# Patient Record
Sex: FEMALE | Race: BLACK OR AFRICAN AMERICAN | NOT HISPANIC OR LATINO | Employment: OTHER | URBAN - METROPOLITAN AREA
[De-identification: names, ages, dates, MRNs, and addresses within clinical notes are randomized per-mention and may not be internally consistent; named-entity substitution may affect disease eponyms.]

---

## 2024-07-20 ENCOUNTER — APPOINTMENT (OUTPATIENT)
Dept: RADIOLOGY | Facility: HOSPITAL | Age: 89
DRG: 194 | End: 2024-07-20
Payer: COMMERCIAL

## 2024-07-20 ENCOUNTER — APPOINTMENT (OUTPATIENT)
Dept: CARDIOLOGY | Facility: HOSPITAL | Age: 89
DRG: 194 | End: 2024-07-20
Payer: COMMERCIAL

## 2024-07-20 ENCOUNTER — HOSPITAL ENCOUNTER (INPATIENT)
Facility: HOSPITAL | Age: 89
LOS: 3 days | Discharge: HOSPICE | DRG: 194 | End: 2024-07-23
Attending: EMERGENCY MEDICINE | Admitting: INTERNAL MEDICINE
Payer: COMMERCIAL

## 2024-07-20 DIAGNOSIS — J18.9 PNEUMONIA DUE TO INFECTIOUS ORGANISM, UNSPECIFIED LATERALITY, UNSPECIFIED PART OF LUNG: Primary | ICD-10-CM

## 2024-07-20 DIAGNOSIS — Z51.5 HOSPICE CARE: ICD-10-CM

## 2024-07-20 LAB
ABO GROUP (TYPE) IN BLOOD: NORMAL
ABO GROUP (TYPE) IN BLOOD: NORMAL
ALBUMIN SERPL BCP-MCNC: 2.8 G/DL (ref 3.4–5)
ALP SERPL-CCNC: 128 U/L (ref 33–136)
ALT SERPL W P-5'-P-CCNC: 16 U/L (ref 7–45)
ANION GAP BLDV CALCULATED.4IONS-SCNC: 10 MMOL/L (ref 10–25)
ANION GAP SERPL CALC-SCNC: 16 MMOL/L (ref 10–20)
ANTIBODY SCREEN: NORMAL
AST SERPL W P-5'-P-CCNC: 28 U/L (ref 9–39)
BASE EXCESS BLDV CALC-SCNC: 1.6 MMOL/L (ref -2–3)
BASOPHILS # BLD AUTO: 0.01 X10*3/UL (ref 0–0.1)
BASOPHILS NFR BLD AUTO: 0.1 %
BILIRUB SERPL-MCNC: 0.4 MG/DL (ref 0–1.2)
BLOOD EXPIRATION DATE: NORMAL
BNP SERPL-MCNC: 140 PG/ML (ref 0–99)
BODY TEMPERATURE: 37 DEGREES CELSIUS
BUN SERPL-MCNC: 67 MG/DL (ref 6–23)
CA-I BLDV-SCNC: 1.24 MMOL/L (ref 1.1–1.33)
CALCIUM SERPL-MCNC: 8.2 MG/DL (ref 8.6–10.3)
CHLORIDE BLDV-SCNC: 120 MMOL/L (ref 98–107)
CHLORIDE SERPL-SCNC: 117 MMOL/L (ref 98–107)
CO2 SERPL-SCNC: 24 MMOL/L (ref 21–32)
CREAT SERPL-MCNC: 1.77 MG/DL (ref 0.5–1.05)
DACRYOCYTES BLD QL SMEAR: NORMAL
DISPENSE STATUS: NORMAL
EGFRCR SERPLBLD CKD-EPI 2021: 27 ML/MIN/1.73M*2
EOSINOPHIL # BLD AUTO: 0.04 X10*3/UL (ref 0–0.4)
EOSINOPHIL NFR BLD AUTO: 0.3 %
ERYTHROCYTE [DISTWIDTH] IN BLOOD BY AUTOMATED COUNT: 21.5 % (ref 11.5–14.5)
GLUCOSE BLD MANUAL STRIP-MCNC: 121 MG/DL (ref 74–99)
GLUCOSE BLD MANUAL STRIP-MCNC: 133 MG/DL (ref 74–99)
GLUCOSE BLD MANUAL STRIP-MCNC: 65 MG/DL (ref 74–99)
GLUCOSE BLD MANUAL STRIP-MCNC: 67 MG/DL (ref 74–99)
GLUCOSE BLDV-MCNC: 79 MG/DL (ref 74–99)
GLUCOSE SERPL-MCNC: 73 MG/DL (ref 74–99)
HCO3 BLDV-SCNC: 26.6 MMOL/L (ref 22–26)
HCT VFR BLD AUTO: 24.1 % (ref 36–46)
HCT VFR BLD EST: 19 % (ref 36–46)
HGB BLD-MCNC: 7 G/DL (ref 12–16)
HGB BLDV-MCNC: 6.3 G/DL (ref 12–16)
HYPOCHROMIA BLD QL SMEAR: NORMAL
IMM GRANULOCYTES # BLD AUTO: 0.17 X10*3/UL (ref 0–0.5)
IMM GRANULOCYTES NFR BLD AUTO: 1.3 % (ref 0–0.9)
INHALED O2 CONCENTRATION: 21 %
INR PPP: 1.5 (ref 0.9–1.1)
IRON SATN MFR SERPL: 34 % (ref 25–45)
IRON SERPL-MCNC: 45 UG/DL (ref 35–150)
LACTATE BLDV-SCNC: 4.2 MMOL/L (ref 0.4–2)
LACTATE BLDV-SCNC: 4.3 MMOL/L (ref 0.4–2)
LACTATE SERPL-SCNC: 4.6 MMOL/L (ref 0.4–2)
LACTATE SERPL-SCNC: 4.6 MMOL/L (ref 0.4–2)
LACTATE SERPL-SCNC: 5 MMOL/L (ref 0.4–2)
LYMPHOCYTES # BLD AUTO: 0.83 X10*3/UL (ref 0.8–3)
LYMPHOCYTES NFR BLD AUTO: 6.4 %
MCH RBC QN AUTO: 24.9 PG (ref 26–34)
MCHC RBC AUTO-ENTMCNC: 29 G/DL (ref 32–36)
MCV RBC AUTO: 86 FL (ref 80–100)
MONOCYTES # BLD AUTO: 0.54 X10*3/UL (ref 0.05–0.8)
MONOCYTES NFR BLD AUTO: 4.2 %
MRSA DNA SPEC QL NAA+PROBE: NOT DETECTED
NEUTROPHILS # BLD AUTO: 11.31 X10*3/UL (ref 1.6–5.5)
NEUTROPHILS NFR BLD AUTO: 87.7 %
NRBC BLD-RTO: 0 /100 WBCS (ref 0–0)
OVALOCYTES BLD QL SMEAR: NORMAL
OXYHGB MFR BLDV: 48.4 % (ref 45–75)
PCO2 BLDV: 43 MM HG (ref 41–51)
PH BLDV: 7.4 PH (ref 7.33–7.43)
PLATELET # BLD AUTO: 192 X10*3/UL (ref 150–450)
PO2 BLDV: 34 MM HG (ref 35–45)
POTASSIUM BLDV-SCNC: 3.4 MMOL/L (ref 3.5–5.3)
POTASSIUM SERPL-SCNC: 3.9 MMOL/L (ref 3.5–5.3)
PRODUCT BLOOD TYPE: 5100
PRODUCT CODE: NORMAL
PROT SERPL-MCNC: 5.6 G/DL (ref 6.4–8.2)
PROTHROMBIN TIME: 16.9 SECONDS (ref 9.8–12.8)
RBC # BLD AUTO: 2.81 X10*6/UL (ref 4–5.2)
RBC MORPH BLD: NORMAL
RH FACTOR (ANTIGEN D): NORMAL
RH FACTOR (ANTIGEN D): NORMAL
SAO2 % BLDV: 50 % (ref 45–75)
SCHISTOCYTES BLD QL SMEAR: NORMAL
SODIUM BLDV-SCNC: 153 MMOL/L (ref 136–145)
SODIUM SERPL-SCNC: 153 MMOL/L (ref 136–145)
TIBC SERPL-MCNC: 134 UG/DL (ref 240–445)
UIBC SERPL-MCNC: 89 UG/DL (ref 110–370)
UNIT ABO: NORMAL
UNIT NUMBER: NORMAL
UNIT RH: NORMAL
UNIT VOLUME: 350
WBC # BLD AUTO: 12.9 X10*3/UL (ref 4.4–11.3)
XM INTEP: NORMAL

## 2024-07-20 PROCEDURE — 96361 HYDRATE IV INFUSION ADD-ON: CPT

## 2024-07-20 PROCEDURE — 2500000004 HC RX 250 GENERAL PHARMACY W/ HCPCS (ALT 636 FOR OP/ED): Performed by: INTERNAL MEDICINE

## 2024-07-20 PROCEDURE — 36415 COLL VENOUS BLD VENIPUNCTURE: CPT | Performed by: EMERGENCY MEDICINE

## 2024-07-20 PROCEDURE — 86901 BLOOD TYPING SEROLOGIC RH(D): CPT

## 2024-07-20 PROCEDURE — 85025 COMPLETE CBC W/AUTO DIFF WBC: CPT

## 2024-07-20 PROCEDURE — 36430 TRANSFUSION BLD/BLD COMPNT: CPT

## 2024-07-20 PROCEDURE — 82947 ASSAY GLUCOSE BLOOD QUANT: CPT

## 2024-07-20 PROCEDURE — 83605 ASSAY OF LACTIC ACID: CPT | Performed by: EMERGENCY MEDICINE

## 2024-07-20 PROCEDURE — 2500000005 HC RX 250 GENERAL PHARMACY W/O HCPCS: Performed by: EMERGENCY MEDICINE

## 2024-07-20 PROCEDURE — 84132 ASSAY OF SERUM POTASSIUM: CPT | Performed by: EMERGENCY MEDICINE

## 2024-07-20 PROCEDURE — P9016 RBC LEUKOCYTES REDUCED: HCPCS

## 2024-07-20 PROCEDURE — 2500000002 HC RX 250 W HCPCS SELF ADMINISTERED DRUGS (ALT 637 FOR MEDICARE OP, ALT 636 FOR OP/ED): Performed by: INTERNAL MEDICINE

## 2024-07-20 PROCEDURE — 2500000001 HC RX 250 WO HCPCS SELF ADMINISTERED DRUGS (ALT 637 FOR MEDICARE OP): Performed by: INTERNAL MEDICINE

## 2024-07-20 PROCEDURE — 71045 X-RAY EXAM CHEST 1 VIEW: CPT

## 2024-07-20 PROCEDURE — 2500000004 HC RX 250 GENERAL PHARMACY W/ HCPCS (ALT 636 FOR OP/ED): Performed by: PHARMACIST

## 2024-07-20 PROCEDURE — 87040 BLOOD CULTURE FOR BACTERIA: CPT | Mod: AHULAB

## 2024-07-20 PROCEDURE — 2060000001 HC INTERMEDIATE ICU ROOM DAILY

## 2024-07-20 PROCEDURE — 36415 COLL VENOUS BLD VENIPUNCTURE: CPT

## 2024-07-20 PROCEDURE — 83605 ASSAY OF LACTIC ACID: CPT | Performed by: INTERNAL MEDICINE

## 2024-07-20 PROCEDURE — 96365 THER/PROPH/DIAG IV INF INIT: CPT

## 2024-07-20 PROCEDURE — 87641 MR-STAPH DNA AMP PROBE: CPT

## 2024-07-20 PROCEDURE — 83605 ASSAY OF LACTIC ACID: CPT

## 2024-07-20 PROCEDURE — 84132 ASSAY OF SERUM POTASSIUM: CPT

## 2024-07-20 PROCEDURE — 85610 PROTHROMBIN TIME: CPT | Performed by: EMERGENCY MEDICINE

## 2024-07-20 PROCEDURE — 86920 COMPATIBILITY TEST SPIN: CPT

## 2024-07-20 PROCEDURE — 93005 ELECTROCARDIOGRAM TRACING: CPT

## 2024-07-20 PROCEDURE — 2500000004 HC RX 250 GENERAL PHARMACY W/ HCPCS (ALT 636 FOR OP/ED)

## 2024-07-20 PROCEDURE — 2500000004 HC RX 250 GENERAL PHARMACY W/ HCPCS (ALT 636 FOR OP/ED): Performed by: EMERGENCY MEDICINE

## 2024-07-20 PROCEDURE — 99285 EMERGENCY DEPT VISIT HI MDM: CPT | Mod: 25

## 2024-07-20 PROCEDURE — 71045 X-RAY EXAM CHEST 1 VIEW: CPT | Mod: FOREIGN READ | Performed by: RADIOLOGY

## 2024-07-20 PROCEDURE — 96366 THER/PROPH/DIAG IV INF ADDON: CPT

## 2024-07-20 PROCEDURE — 94640 AIRWAY INHALATION TREATMENT: CPT

## 2024-07-20 PROCEDURE — 83880 ASSAY OF NATRIURETIC PEPTIDE: CPT

## 2024-07-20 PROCEDURE — 83540 ASSAY OF IRON: CPT | Performed by: INTERNAL MEDICINE

## 2024-07-20 RX ORDER — ACETAMINOPHEN 650 MG/1
650 SUPPOSITORY RECTAL EVERY 4 HOURS PRN
Status: DISCONTINUED | OUTPATIENT
Start: 2024-07-20 | End: 2024-07-23 | Stop reason: HOSPADM

## 2024-07-20 RX ORDER — SERTRALINE HYDROCHLORIDE 50 MG/1
50 TABLET, FILM COATED ORAL DAILY
COMMUNITY
End: 2024-07-23 | Stop reason: HOSPADM

## 2024-07-20 RX ORDER — DEXTROSE 50 % IN WATER (D50W) INTRAVENOUS SYRINGE
25
Status: DISCONTINUED | OUTPATIENT
Start: 2024-07-20 | End: 2024-07-23 | Stop reason: HOSPADM

## 2024-07-20 RX ORDER — AMIODARONE HYDROCHLORIDE 200 MG/1
200 TABLET ORAL DAILY
Status: DISCONTINUED | OUTPATIENT
Start: 2024-07-20 | End: 2024-07-23 | Stop reason: HOSPADM

## 2024-07-20 RX ORDER — ONDANSETRON HYDROCHLORIDE 2 MG/ML
4 INJECTION, SOLUTION INTRAVENOUS EVERY 8 HOURS PRN
Status: DISCONTINUED | OUTPATIENT
Start: 2024-07-20 | End: 2024-07-23 | Stop reason: HOSPADM

## 2024-07-20 RX ORDER — DIVALPROEX SODIUM 125 MG/1
125 CAPSULE, COATED PELLETS ORAL 2 TIMES DAILY
COMMUNITY
End: 2024-07-23 | Stop reason: HOSPADM

## 2024-07-20 RX ORDER — ACETAMINOPHEN 325 MG/1
650 TABLET ORAL EVERY 4 HOURS PRN
Status: DISCONTINUED | OUTPATIENT
Start: 2024-07-20 | End: 2024-07-23 | Stop reason: HOSPADM

## 2024-07-20 RX ORDER — POLYETHYLENE GLYCOL 3350 17 G/17G
17 POWDER, FOR SOLUTION ORAL DAILY
Status: DISCONTINUED | OUTPATIENT
Start: 2024-07-20 | End: 2024-07-22

## 2024-07-20 RX ORDER — DEXTROSE 50 % IN WATER (D50W) INTRAVENOUS SYRINGE
12.5
Status: DISCONTINUED | OUTPATIENT
Start: 2024-07-20 | End: 2024-07-23 | Stop reason: HOSPADM

## 2024-07-20 RX ORDER — ACETAMINOPHEN 160 MG/5ML
650 SOLUTION ORAL EVERY 4 HOURS PRN
Status: DISCONTINUED | OUTPATIENT
Start: 2024-07-20 | End: 2024-07-23 | Stop reason: HOSPADM

## 2024-07-20 RX ORDER — ALBUTEROL SULFATE 0.83 MG/ML
3 SOLUTION RESPIRATORY (INHALATION)
Status: DISCONTINUED | OUTPATIENT
Start: 2024-07-20 | End: 2024-07-23

## 2024-07-20 RX ORDER — VANCOMYCIN HYDROCHLORIDE 1 G/200ML
1000 INJECTION, SOLUTION INTRAVENOUS ONCE
Status: COMPLETED | OUTPATIENT
Start: 2024-07-20 | End: 2024-07-20

## 2024-07-20 RX ORDER — HEPARIN SODIUM 5000 [USP'U]/ML
5000 INJECTION, SOLUTION INTRAVENOUS; SUBCUTANEOUS EVERY 8 HOURS SCHEDULED
Status: DISCONTINUED | OUTPATIENT
Start: 2024-07-20 | End: 2024-07-23 | Stop reason: HOSPADM

## 2024-07-20 RX ORDER — DEXTROSE 50 % IN WATER (D50W) INTRAVENOUS SYRINGE
25 ONCE
Status: COMPLETED | OUTPATIENT
Start: 2024-07-20 | End: 2024-07-20

## 2024-07-20 RX ORDER — AMLODIPINE BESYLATE 10 MG/1
10 TABLET ORAL DAILY
COMMUNITY
End: 2024-07-23 | Stop reason: HOSPADM

## 2024-07-20 RX ORDER — TRAZODONE HYDROCHLORIDE 50 MG/1
50 TABLET ORAL NIGHTLY
COMMUNITY
End: 2024-07-23 | Stop reason: HOSPADM

## 2024-07-20 RX ORDER — VANCOMYCIN HYDROCHLORIDE 1 G/20ML
INJECTION, POWDER, LYOPHILIZED, FOR SOLUTION INTRAVENOUS DAILY PRN
Status: DISCONTINUED | OUTPATIENT
Start: 2024-07-20 | End: 2024-07-20

## 2024-07-20 RX ORDER — DEXTROSE MONOHYDRATE AND SODIUM CHLORIDE 5; .45 G/100ML; G/100ML
75 INJECTION, SOLUTION INTRAVENOUS CONTINUOUS
Status: DISCONTINUED | OUTPATIENT
Start: 2024-07-20 | End: 2024-07-22

## 2024-07-20 RX ORDER — FUROSEMIDE 20 MG/1
20 TABLET ORAL DAILY
COMMUNITY
End: 2024-07-23 | Stop reason: HOSPADM

## 2024-07-20 RX ORDER — ACETAMINOPHEN 325 MG/1
325 TABLET ORAL EVERY 6 HOURS PRN
COMMUNITY

## 2024-07-20 RX ORDER — SODIUM CHLORIDE 9 MG/ML
10 INJECTION, SOLUTION INTRAMUSCULAR; INTRAVENOUS; SUBCUTANEOUS AS NEEDED
COMMUNITY
End: 2024-07-23 | Stop reason: HOSPADM

## 2024-07-20 RX ORDER — POTASSIUM CHLORIDE 1.5 G/1.58G
20 POWDER, FOR SOLUTION ORAL DAILY
COMMUNITY
End: 2024-07-23 | Stop reason: HOSPADM

## 2024-07-20 RX ORDER — METOPROLOL SUCCINATE 25 MG/1
25 TABLET, EXTENDED RELEASE ORAL DAILY
Status: DISCONTINUED | OUTPATIENT
Start: 2024-07-20 | End: 2024-07-22

## 2024-07-20 RX ORDER — SERTRALINE HYDROCHLORIDE 50 MG/1
50 TABLET, FILM COATED ORAL DAILY
Status: DISCONTINUED | OUTPATIENT
Start: 2024-07-20 | End: 2024-07-22

## 2024-07-20 RX ORDER — ALBUTEROL SULFATE 0.83 MG/ML
3 SOLUTION RESPIRATORY (INHALATION)
Status: DISCONTINUED | OUTPATIENT
Start: 2024-07-20 | End: 2024-07-20

## 2024-07-20 RX ORDER — METOPROLOL SUCCINATE 25 MG/1
25 TABLET, EXTENDED RELEASE ORAL DAILY
COMMUNITY
End: 2024-07-23 | Stop reason: HOSPADM

## 2024-07-20 RX ORDER — FLUTICASONE PROPIONATE 50 MCG
1 SPRAY, SUSPENSION (ML) NASAL NIGHTLY
COMMUNITY
End: 2024-07-23 | Stop reason: HOSPADM

## 2024-07-20 RX ORDER — ONDANSETRON 4 MG/1
4 TABLET, FILM COATED ORAL EVERY 8 HOURS PRN
Status: DISCONTINUED | OUTPATIENT
Start: 2024-07-20 | End: 2024-07-23 | Stop reason: HOSPADM

## 2024-07-20 RX ORDER — AMIODARONE HYDROCHLORIDE 200 MG/1
200 TABLET ORAL DAILY
COMMUNITY

## 2024-07-20 RX ORDER — OMEPRAZOLE 20 MG/1
20 CAPSULE, DELAYED RELEASE ORAL
COMMUNITY
End: 2024-07-23 | Stop reason: HOSPADM

## 2024-07-20 RX ORDER — ALBUTEROL SULFATE 0.83 MG/ML
2.5 SOLUTION RESPIRATORY (INHALATION) EVERY 2 HOUR PRN
Status: DISCONTINUED | OUTPATIENT
Start: 2024-07-20 | End: 2024-07-23 | Stop reason: HOSPADM

## 2024-07-20 ASSESSMENT — PAIN - FUNCTIONAL ASSESSMENT
PAIN_FUNCTIONAL_ASSESSMENT: 0-10
PAIN_FUNCTIONAL_ASSESSMENT: 0-10

## 2024-07-20 ASSESSMENT — COGNITIVE AND FUNCTIONAL STATUS - GENERAL
EATING MEALS: TOTAL
HELP NEEDED FOR BATHING: TOTAL
MOVING TO AND FROM BED TO CHAIR: TOTAL
STANDING UP FROM CHAIR USING ARMS: TOTAL
DRESSING REGULAR LOWER BODY CLOTHING: TOTAL
WALKING IN HOSPITAL ROOM: TOTAL
DAILY ACTIVITIY SCORE: 6
MOBILITY SCORE: 6
PERSONAL GROOMING: TOTAL
CLIMB 3 TO 5 STEPS WITH RAILING: TOTAL
TOILETING: TOTAL
MOVING FROM LYING ON BACK TO SITTING ON SIDE OF FLAT BED WITH BEDRAILS: TOTAL
TURNING FROM BACK TO SIDE WHILE IN FLAT BAD: TOTAL
DRESSING REGULAR UPPER BODY CLOTHING: TOTAL

## 2024-07-20 ASSESSMENT — COLUMBIA-SUICIDE SEVERITY RATING SCALE - C-SSRS
2. HAVE YOU ACTUALLY HAD ANY THOUGHTS OF KILLING YOURSELF?: NO
1. IN THE PAST MONTH, HAVE YOU WISHED YOU WERE DEAD OR WISHED YOU COULD GO TO SLEEP AND NOT WAKE UP?: NO
6. HAVE YOU EVER DONE ANYTHING, STARTED TO DO ANYTHING, OR PREPARED TO DO ANYTHING TO END YOUR LIFE?: NO

## 2024-07-20 ASSESSMENT — PAIN SCALES - GENERAL
PAINLEVEL_OUTOF10: 0 - NO PAIN
PAINLEVEL_OUTOF10: 0 - NO PAIN

## 2024-07-20 NOTE — PROGRESS NOTES
Pharmacy Medication History Review    Chichi Nice is a 89 y.o. female admitted for Pneumonia due to infectious organism, unspecified laterality, unspecified part of lung. Pharmacy reviewed the patient's [id number] medications and allergies for accuracy.    The list below reflectives the updated PTA list. Please review each medication in order reconciliation for additional clarification and justification.  Prior to Admission Medications   Prescriptions Last Dose Informant     0.9 % sodium chloride (sodium chloride, PF, 0.9%) injection Past Week      Sig: Infuse 10 mL into a venous catheter if needed for line care.   acetaminophen (Tylenol) 325 mg tablet Past Week      Sig: Take 1 tablet (325 mg) by mouth every 6 hours if needed for mild pain (1 - 3).   amLODIPine (Norvasc) 10 mg tablet Past Week      Sig: Take 1 tablet (10 mg) by mouth once daily.   amiodarone (Pacerone) 200 mg tablet Past Week      Sig: Take 1 tablet (200 mg) by mouth once daily.   divalproex sprinkle (Depakote Sprinkle) 125 mg DR capsule Past Week      Sig: Take 1 capsule (125 mg) by mouth 2 times a day.   fluticasone (Flonase) 50 mcg/actuation nasal spray Past Week      Sig: Administer 1 spray into each nostril once daily at bedtime. Shake gently. Before first use, prime pump. After use, clean tip and replace cap.   furosemide (Lasix) 20 mg tablet Past Week      Sig: Take 1 tablet (20 mg) by mouth once daily.   metoprolol succinate XL (Toprol-XL) 25 mg 24 hr tablet Past Week      Sig: Take 1 tablet (25 mg) by mouth once daily. Do not crush or chew.   omeprazole (PriLOSEC) 20 mg DR capsule Past Week      Sig: Take 1 capsule (20 mg) by mouth once daily in the morning. Take before meals. Do not crush or chew.   potassium chloride (Klor-Con) 20 mEq packet Past Week      Sig: Take 20 mEq by mouth once daily.   sertraline (Zoloft) 50 mg tablet Past Week      Sig: Take 1 tablet (50 mg) by mouth once daily.   traZODone (Desyrel) 50 mg tablet  Past Week      Sig: Take 1 tablet (50 mg) by mouth once daily at bedtime.      Facility-Administered Medications: None      Allergies  Reviewed by Diane Sofia on 7/20/2024   No Known Allergies         Below are additional concerns with the patient's PTA list.  Medication list sent from facility.    Diane Sofia

## 2024-07-20 NOTE — ED TRIAGE NOTES
Pt came to the ED by EMS with complaints of abnormal labs sodium and H&H. Pt denies any headache. Pt has a history of Dementia. Pt is coming from [facility]. Upon daughter coming in states that she hasn't been eating and not feeling well the last few days.

## 2024-07-20 NOTE — ED PROVIDER NOTES
CC: abnormal labs      History provided by: Family Member and EMS  Limitations to History: Altered Mental Status and Dementia    HPI:    Patient is a 89-year-old female with a PMH of CAD, moderate MR, HTN, chronic diastolic heart failure, hyperlipidemia, GERD CKD 3, atrial fibrillation, dual-chamber pacemaker, and dementia who presents to the emergency department via EMS from a SNF for chief complaint of abnormal labs.  SNF paperwork and labs remarkable for hemoglobin of 6.8 and hyponatremia.  Upon patient's arrival she is a poor historian and not able to elicit much history.  Upon discussion with patient's daughter she reports that the the SNF called her and said the patient had abnormal labs and she is being sent to the emergency department.    External Records Reviewed: Previous ED records, inpatient records, and outpatient records  ???????????????????????????????????????????????????????????????  Triage Vitals:  T 36.8 °C (98.2 °F)  HR 70  BP (!) 113/40  RR 17  O2 96 % None (Room air)    Physical Exam  Constitutional:       Appearance: She is ill-appearing.      Comments: Chronically ill-appearing   HENT:      Head: Atraumatic.      Mouth/Throat:      Lips: Pink.      Mouth: Mucous membranes are dry.   Eyes:      Extraocular Movements: Extraocular movements intact.      Conjunctiva/sclera: Conjunctivae normal.      Pupils: Pupils are equal, round, and reactive to light.   Cardiovascular:      Rate and Rhythm: Normal rate and regular rhythm.      Pulses:           Radial pulses are 2+ on the right side and 2+ on the left side.        Dorsalis pedis pulses are 2+ on the right side and 2+ on the left side.      Heart sounds: Normal heart sounds, S1 normal and S2 normal. Heart sounds not distant. No murmur heard.     No friction rub. No gallop.   Pulmonary:      Effort: Pulmonary effort is normal. No respiratory distress.      Breath sounds: Normal breath sounds.      Comments: Lungs are clear to auscultation  bilaterally  Abdominal:      General: Abdomen is scaphoid. There is no distension.      Palpations: Abdomen is soft.      Tenderness: There is no abdominal tenderness. There is no guarding or rebound.   Musculoskeletal:      Right lower leg: No edema.      Left lower leg: No edema.   Skin:     General: Skin is warm and dry.      Capillary Refill: Capillary refill takes 2 to 3 seconds.   Neurological:      Comments: Alert and oriented x 0        ???????????????????????????????????????????????????????????????  ED Course/Treatment/Medical Decision Making    EKG Interpretation:  Atrial paced rhythm. Rate of 70 bpm. Normal axis.  Prolonged QT. No acute ST elevations, depressions, or T wave inversions.  Unchanged when compared to previous EKG completed on April 2013    Independent Interpretation of Studies:  I independently interpreted: CXR, EKG    Differential diagnoses considered include but ar not limited to: Pneumonia, urinary tract infection, GI bleed, intra-abdominal mass, ACS, failure to thrive    Social Determinants Limiting Care:  None identified         ED Course:  Diagnoses as of 07/23/24 2152   Pneumonia due to infectious organism, unspecified laterality, unspecified part of lung       MDM:    Patient is a 89-year-old female with above PMH who presents to the emergency department for chief complaint of abnormal labs.  Upon arrival patient's vital signs are within normal limits and she appears chronically ill-appearing.  Upon examination the patient is alert and oriented x 1.  Given patient's recent drop in hemoglobin with SNF labs of hemoglobin 6.8 rectal exam was performed.  There is no blood in the rectal vault.  VBG remarkable for lactate of 4.3 and hemoglobin of 6.3.  There are no overt signs of bleeding on physical examination.  The patient will be given 500 mL of lactated Ringer's.  CBC is remarkable for hemoglobin of 7.0, leukocytosis with left shift. CMP remarkable for acute kidney injury and  hypernatremia of 153.  Given patient's anemia and frail status she will be transfused 1 unit of packed red blood cells.  She will be given an additional 500 mL of lactated Ringer's given collapsible IVC.  Upon reexamination the patient has soft blood pressures with MAP 65-70.  Patient be started on vancomycin and Zosyn for concerns for sepsis today.  Chest x-rays with evidence of pneumonia.  After fluid administration repeat lactate is 4.2.  Urinalysis without evidence of urinary tract infection.  The patient was admitted to internal medicine for further management on the stepdown unit in stable condition.    Impression:  Pneumonia  Sepsis    Disposition:  Admitted to internal medicine stepdown    Patient was staffed and discussed with ED attending Dr. Evelin Perdomo, DO   Emergency Medicine, PGY-2      Procedures ? SmartLinks last updated 7/23/2024 9:52 PM          Abraham Perdomo, DO  Resident  07/23/24 8421

## 2024-07-21 ENCOUNTER — APPOINTMENT (OUTPATIENT)
Dept: RADIOLOGY | Facility: HOSPITAL | Age: 89
DRG: 194 | End: 2024-07-21
Payer: COMMERCIAL

## 2024-07-21 LAB
ANION GAP SERPL CALC-SCNC: 15 MMOL/L (ref 10–20)
ANION GAP SERPL CALC-SCNC: 15 MMOL/L (ref 10–20)
BUN SERPL-MCNC: 58 MG/DL (ref 6–23)
BUN SERPL-MCNC: 58 MG/DL (ref 6–23)
CALCIUM SERPL-MCNC: 8 MG/DL (ref 8.6–10.3)
CALCIUM SERPL-MCNC: 8 MG/DL (ref 8.6–10.3)
CHLORIDE SERPL-SCNC: 115 MMOL/L (ref 98–107)
CHLORIDE SERPL-SCNC: 117 MMOL/L (ref 98–107)
CO2 SERPL-SCNC: 23 MMOL/L (ref 21–32)
CO2 SERPL-SCNC: 24 MMOL/L (ref 21–32)
CREAT SERPL-MCNC: 1.54 MG/DL (ref 0.5–1.05)
CREAT SERPL-MCNC: 1.54 MG/DL (ref 0.5–1.05)
EGFRCR SERPLBLD CKD-EPI 2021: 32 ML/MIN/1.73M*2
EGFRCR SERPLBLD CKD-EPI 2021: 32 ML/MIN/1.73M*2
ERYTHROCYTE [DISTWIDTH] IN BLOOD BY AUTOMATED COUNT: 20.7 % (ref 11.5–14.5)
GLUCOSE SERPL-MCNC: 116 MG/DL (ref 74–99)
GLUCOSE SERPL-MCNC: 90 MG/DL (ref 74–99)
HCT VFR BLD AUTO: 28.2 % (ref 36–46)
HGB BLD-MCNC: 8.5 G/DL (ref 12–16)
LACTATE SERPL-SCNC: 4.7 MMOL/L (ref 0.4–2)
LACTATE SERPL-SCNC: 4.9 MMOL/L (ref 0.4–2)
MCH RBC QN AUTO: 25.7 PG (ref 26–34)
MCHC RBC AUTO-ENTMCNC: 30.1 G/DL (ref 32–36)
MCV RBC AUTO: 85 FL (ref 80–100)
NRBC BLD-RTO: 0 /100 WBCS (ref 0–0)
PLATELET # BLD AUTO: 183 X10*3/UL (ref 150–450)
POTASSIUM SERPL-SCNC: 3.7 MMOL/L (ref 3.5–5.3)
POTASSIUM SERPL-SCNC: 4.2 MMOL/L (ref 3.5–5.3)
RBC # BLD AUTO: 3.31 X10*6/UL (ref 4–5.2)
SODIUM SERPL-SCNC: 150 MMOL/L (ref 136–145)
SODIUM SERPL-SCNC: 151 MMOL/L (ref 136–145)
WBC # BLD AUTO: 14.6 X10*3/UL (ref 4.4–11.3)

## 2024-07-21 PROCEDURE — 83605 ASSAY OF LACTIC ACID: CPT | Performed by: INTERNAL MEDICINE

## 2024-07-21 PROCEDURE — 36415 COLL VENOUS BLD VENIPUNCTURE: CPT | Performed by: INTERNAL MEDICINE

## 2024-07-21 PROCEDURE — 2500000002 HC RX 250 W HCPCS SELF ADMINISTERED DRUGS (ALT 637 FOR MEDICARE OP, ALT 636 FOR OP/ED): Performed by: INTERNAL MEDICINE

## 2024-07-21 PROCEDURE — 76770 US EXAM ABDO BACK WALL COMP: CPT

## 2024-07-21 PROCEDURE — 85027 COMPLETE CBC AUTOMATED: CPT | Performed by: INTERNAL MEDICINE

## 2024-07-21 PROCEDURE — 2500000004 HC RX 250 GENERAL PHARMACY W/ HCPCS (ALT 636 FOR OP/ED): Performed by: INTERNAL MEDICINE

## 2024-07-21 PROCEDURE — 80048 BASIC METABOLIC PNL TOTAL CA: CPT | Performed by: INTERNAL MEDICINE

## 2024-07-21 PROCEDURE — 76770 US EXAM ABDO BACK WALL COMP: CPT | Performed by: RADIOLOGY

## 2024-07-21 PROCEDURE — 94640 AIRWAY INHALATION TREATMENT: CPT

## 2024-07-21 PROCEDURE — 2060000001 HC INTERMEDIATE ICU ROOM DAILY

## 2024-07-21 ASSESSMENT — COGNITIVE AND FUNCTIONAL STATUS - GENERAL
DAILY ACTIVITIY SCORE: 12
DAILY ACTIVITIY SCORE: 12
STANDING UP FROM CHAIR USING ARMS: A LOT
MOBILITY SCORE: 10
DRESSING REGULAR LOWER BODY CLOTHING: A LOT
TURNING FROM BACK TO SIDE WHILE IN FLAT BAD: A LOT
CLIMB 3 TO 5 STEPS WITH RAILING: TOTAL
TOILETING: A LOT
CLIMB 3 TO 5 STEPS WITH RAILING: TOTAL
HELP NEEDED FOR BATHING: A LOT
DRESSING REGULAR LOWER BODY CLOTHING: A LOT
DRESSING REGULAR UPPER BODY CLOTHING: A LOT
CLIMB 3 TO 5 STEPS WITH RAILING: TOTAL
EATING MEALS: A LOT
DRESSING REGULAR UPPER BODY CLOTHING: A LOT
MOBILITY SCORE: 9
MOVING FROM LYING ON BACK TO SITTING ON SIDE OF FLAT BED WITH BEDRAILS: A LOT
MOVING TO AND FROM BED TO CHAIR: TOTAL
PERSONAL GROOMING: A LOT
STANDING UP FROM CHAIR USING ARMS: A LOT
DRESSING REGULAR LOWER BODY CLOTHING: A LOT
MOVING FROM LYING ON BACK TO SITTING ON SIDE OF FLAT BED WITH BEDRAILS: A LOT
HELP NEEDED FOR BATHING: A LOT
DAILY ACTIVITIY SCORE: 12
TURNING FROM BACK TO SIDE WHILE IN FLAT BAD: A LOT
HELP NEEDED FOR BATHING: A LOT
TOILETING: A LOT
WALKING IN HOSPITAL ROOM: TOTAL
TOILETING: A LOT
EATING MEALS: A LOT
MOVING TO AND FROM BED TO CHAIR: TOTAL
MOVING TO AND FROM BED TO CHAIR: A LOT
PERSONAL GROOMING: A LOT
MOBILITY SCORE: 9
WALKING IN HOSPITAL ROOM: TOTAL
EATING MEALS: A LOT
PERSONAL GROOMING: A LOT
DRESSING REGULAR UPPER BODY CLOTHING: A LOT
MOVING FROM LYING ON BACK TO SITTING ON SIDE OF FLAT BED WITH BEDRAILS: A LOT
STANDING UP FROM CHAIR USING ARMS: A LOT
TURNING FROM BACK TO SIDE WHILE IN FLAT BAD: A LOT
WALKING IN HOSPITAL ROOM: TOTAL

## 2024-07-21 ASSESSMENT — PAIN SCALES - GENERAL
PAINLEVEL_OUTOF10: 0 - NO PAIN

## 2024-07-21 ASSESSMENT — PAIN - FUNCTIONAL ASSESSMENT
PAIN_FUNCTIONAL_ASSESSMENT: 0-10

## 2024-07-21 ASSESSMENT — ENCOUNTER SYMPTOMS
FEVER: 0
ACTIVITY CHANGE: 1
SHORTNESS OF BREATH: 1
CHEST TIGHTNESS: 0
APNEA: 0
WEAKNESS: 1
FATIGUE: 1
UNEXPECTED WEIGHT CHANGE: 0
APPETITE CHANGE: 1

## 2024-07-21 NOTE — CARE PLAN
The patient's goals for the shift include      The clinical goals for the shift include  HDS through the shift.    Problem: Fall/Injury  Goal: Not fall by end of shift  Outcome: Progressing  Goal: Be free from injury by end of the shift  Outcome: Progressing  Goal: Verbalize understanding of personal risk factors for fall in the hospital  Outcome: Progressing  Goal: Verbalize understanding of risk factor reduction measures to prevent injury from fall in the home  Outcome: Progressing  Goal: Use assistive devices by end of the shift  Outcome: Progressing  Goal: Pace activities to prevent fatigue by end of the shift  Outcome: Progressing     Problem: Respiratory  Goal: Clear secretions with interventions this shift  Outcome: Progressing  Goal: Minimize anxiety/maximize coping throughout shift  Outcome: Progressing  Goal: Minimal/no exertional discomfort or dyspnea this shift  Outcome: Progressing  Goal: No signs of respiratory distress (eg. Use of accessory muscles. Peds grunting)  Outcome: Progressing  Goal: Patent airway maintained this shift  Outcome: Progressing  Goal: Tolerate mechanical ventilation evidenced by VS/agitation level this shift  Outcome: Progressing  Goal: Tolerate pulmonary toileting this shift  Outcome: Progressing  Goal: Verbalize decreased shortness of breath this shift  Outcome: Progressing  Goal: Wean oxygen to maintain O2 saturation per order/standard this shift  Outcome: Progressing  Goal: Increase self care and/or family involvement in next 24 hours  Outcome: Progressing     Problem: Pain  Goal: Takes deep breaths with improved pain control throughout the shift  Outcome: Progressing  Goal: Turns in bed with improved pain control throughout the shift  Outcome: Progressing  Goal: Walks with improved pain control throughout the shift  Outcome: Progressing  Goal: Performs ADL's with improved pain control throughout shift  Outcome: Progressing  Goal: Participates in PT with improved pain control  throughout the shift  Outcome: Progressing  Goal: Free from opioid side effects throughout the shift  Outcome: Progressing  Goal: Free from acute confusion related to pain meds throughout the shift  Outcome: Progressing

## 2024-07-21 NOTE — PROGRESS NOTES
Occupational Therapy                 Therapy Communication Note    Patient Name: Chichi Nice  MRN: [id number]  Today's Date: 7/21/2024     Discipline: Occupational Therapy    Missed Visit Reason: Missed Visit Reason: Other (Comment)    Comment: Order received, chart reviewed. Upon communication with bedside RN, patient LTC resident. RN stating communication with staff at LTC facility reporting impaired cognition, primarily bed bound, and total dependent assist for functional transfers (nonambulatory at baseline) and ADLs. Patient status appears at baseline level per RN report. Screen and discharge from acute OT services.

## 2024-07-21 NOTE — CONSULTS
"Reason For Consult  MARTINEZ on top of CKD stage III with hypernatremia    History Of Present Illness  Chichi Nice is a 89 y.o. female presenting with altered mental status.  Patient presented from [facility] with altered mental status, she has history of dementia, coronary disease, moderate mitral valve disorder, hypertension, chronic diastolic congestive heart failure, hyperlipidemia, GERD, CKD stage III with history of orthostatic hypotension, atrial fibrillation, patient labs drawn Emergency Department was found to have high sodium level with MARTINEZ on top of CKD stage III.  Nephrology was consulted for MARTINEZ of CKD stage III and hypernatremia.  Past Medical History  She has no past medical history on file.    Surgical History  She has no past surgical history on file.     Social History  She has no history on file for tobacco use, alcohol use, and drug use.    Family History  No family history on file.     Allergies  Patient has no known allergies.    Review of Systems  All systems were reviewed     Physical Exam  General appearance: Sick looking elderly female with no complaints, confusion but no agitation.  Daughter reported in ED that patient does not eat or drink well in the current facility.  Head and ENT: Normocephalic/atraumatic/supple neck/no JVD  Lungs; fine crackles bilateral.  Heart: RRR  Right-sided cardiac pacemaker noted  Abdomen; soft no tenderness organomegaly  Extremities; minimal bilateral edema noted  Neurologic: Dementia with no agitation           I&O 24HR    Intake/Output Summary (Last 24 hours) at 7/21/2024 1101  Last data filed at 7/21/2024 0937  Gross per 24 hour   Intake 3037.33 ml   Output 400 ml   Net 2637.33 ml       Vitals 24HR  Heart Rate:  [62-91]   Temp:  [35.9 °C (96.6 °F)-37 °C (98.6 °F)]   Resp:  [14-18]   BP: ()/()   Height:  [170.2 cm (5' 7\")]   Weight:  [53.6 kg (118 lb 3.2 oz)]   SpO2:  [90 %-100 %]         Relevant Results  Results from last 7 " days   Lab Units 07/21/24  0602 07/20/24  1110   SODIUM mmol/L 151* 153*   POTASSIUM mmol/L 4.2 3.9   CHLORIDE mmol/L 117* 117*   CO2 mmol/L 23 24   BUN mg/dL 58* 67*   CREATININE mg/dL 1.54* 1.77*   GLUCOSE mg/dL 90 73*   CALCIUM mg/dL 8.0* 8.2*     Results from last 7 days   Lab Units 07/21/24  0602 07/20/24  1110   WBC AUTO x10*3/uL 14.6* 12.9*   HEMOGLOBIN g/dL 8.5* 7.0*   HEMATOCRIT % 28.2* 24.1*   PLATELETS AUTO x10*3/uL 183 192   XR chest 1 view    Result Date: 7/20/2024  STUDY: Chest Radiograph;  7/20/24 at 10:55 AM INDICATION: Heart failure. COMPARISON: None available. ACCESSION NUMBER(S): [id number] ORDERING CLINICIAN: LAUREL COLEMAN TECHNIQUE:  Frontal chest was obtained at 1055 hours. FINDINGS: CARDIOMEDIASTINAL SILHOUETTE: Cardiomediastinal silhouette is normal in size and configuration. Vascular calcifications are identified.  LUNGS: Chronic interstitial lung changes are identified.  There is elevation of the right hemidiaphragm.  Left-sided cardiac pacer device is seen. Left lower lobe airspace disease is seen.   ABDOMEN: No remarkable upper abdominal findings.  BONES: No acute osseous changes.Generalized osteopenia is noted.    Left lower lobe airspace disease is seen, concerning for pneumonia. Signed by Jessie Mulligan MD         Assessment/Plan   1.  MARTINEZ with hypernatremia, due to poor p.o. intake especially free water.  Patient was given lactated Ringer initially and now she is on D5 and half-normal saline at 75 cc an hour.  Will follow patient closely with the labs and clinical condition.  MARTINEZ already resolving.  2.  Pneumonia patient is on different antibiotics  3.  Dementia  4.  Valvular heart disease with peripheral edema  5.  Chronic diastolic congestive heart failure    Will continue daily follow-up, exams and evaluation will monitor the patient closely with medical team and all other consultants.  Will check urine lites today and portable ultrasound kidneys throat obstruction.    Principal  Problem:    Pneumonia due to infectious organism, unspecified laterality, unspecified part of lung      I spent 54 minutes in the professional and overall care of this patient.      Kiara Ponce MD

## 2024-07-21 NOTE — H&P
History Of Present Illness  Chichi Nice is a 89 y.o. female presenting with altered mental status.  She is a 89-year-old -American female, she is a long-term nursing home resident, secondary to her dementia, and also she has history of coronary artery disease, moderate mitral valve regurgitation, hypertension, chronic diastolic heart failure, hyperlipidemia, GERD, CKD 3, history of orthostatic hypotension, atrial fibrillation, atrial flutter, not on any anticoagulation, history of GERD, came to the emergency department, with altered mental status, very poor historian, and also had a lab done, with a very high sodium and acute kidney injury.  She was seen in the emergency department she had a workup done including labs and the x-ray, she was diagnosed with pneumonia MARTINEZ and Hyponatremia and admitted here for further management.  Past medical history as mentioned above.  Dementia  Hypertension tension.  Orthostatic hypotension.  Depression  Behavioral changes and problems in the nursing home.  Atrial fibrillation.  Coronary artery disease.  Dual-chamber pacemaker.  Moderate MR.  Coronary artery disease.  Chronic diastolic heart failure.  Social history  She does not smoke.  Family history unable.  Allergies reviewed.  .     Past Medical History  She has no past medical history on file.    Surgical History  She has no past surgical history on file.     Social History  She has no history on file for tobacco use, alcohol use, and drug use.    Family History  No family history on file.     Allergies  Patient has no known allergies.    Review of Systems   Unable to perform ROS: Mental status change   Constitutional:  Positive for activity change, appetite change and fatigue. Negative for fever and unexpected weight change.   HENT:  Negative for congestion and dental problem.    Respiratory:  Positive for shortness of breath. Negative for apnea and chest tightness.    Neurological:  Positive for weakness.       "  Physical Exam  Constitutional:       Appearance: She is ill-appearing.   HENT:      Head: Normocephalic and atraumatic.   Eyes:      Pupils: Pupils are equal, round, and reactive to light.   Cardiovascular:      Rate and Rhythm: Normal rate and regular rhythm.   Pulmonary:      Breath sounds: Wheezing present. No rhonchi.   Abdominal:      General: Abdomen is flat.      Palpations: Abdomen is soft.   Musculoskeletal:      Right lower leg: Edema present.      Left lower leg: Edema present.   Neurological:      Mental Status: She is alert. She is disoriented.      Motor: Weakness present.          Last Recorded Vitals  Blood pressure 93/50, pulse 71, temperature 36.2 °C (97.2 °F), temperature source Temporal, resp. rate 18, height 1.702 m (5' 7\"), weight 53.6 kg (118 lb 3.2 oz), SpO2 95%.    Relevant Results  Results for orders placed or performed during the hospital encounter of 07/20/24 (from the past 96 hour(s))   CBC and Auto Differential   Result Value Ref Range    WBC 12.9 (H) 4.4 - 11.3 x10*3/uL    nRBC 0.0 0.0 - 0.0 /100 WBCs    RBC 2.81 (L) 4.00 - 5.20 x10*6/uL    Hemoglobin 7.0 (L) 12.0 - 16.0 g/dL    Hematocrit 24.1 (L) 36.0 - 46.0 %    MCV 86 80 - 100 fL    MCH 24.9 (L) 26.0 - 34.0 pg    MCHC 29.0 (L) 32.0 - 36.0 g/dL    RDW 21.5 (H) 11.5 - 14.5 %    Platelets 192 150 - 450 x10*3/uL    Neutrophils % 87.7 40.0 - 80.0 %    Immature Granulocytes %, Automated 1.3 (H) 0.0 - 0.9 %    Lymphocytes % 6.4 13.0 - 44.0 %    Monocytes % 4.2 2.0 - 10.0 %    Eosinophils % 0.3 0.0 - 6.0 %    Basophils % 0.1 0.0 - 2.0 %    Neutrophils Absolute 11.31 (H) 1.60 - 5.50 x10*3/uL    Immature Granulocytes Absolute, Automated 0.17 0.00 - 0.50 x10*3/uL    Lymphocytes Absolute 0.83 0.80 - 3.00 x10*3/uL    Monocytes Absolute 0.54 0.05 - 0.80 x10*3/uL    Eosinophils Absolute 0.04 0.00 - 0.40 x10*3/uL    Basophils Absolute 0.01 0.00 - 0.10 x10*3/uL   Type and Screen   Result Value Ref Range    ABO TYPE O     Rh TYPE POS     ANTIBODY " SCREEN NEG    Comprehensive metabolic panel   Result Value Ref Range    Glucose 73 (L) 74 - 99 mg/dL    Sodium 153 (H) 136 - 145 mmol/L    Potassium 3.9 3.5 - 5.3 mmol/L    Chloride 117 (H) 98 - 107 mmol/L    Bicarbonate 24 21 - 32 mmol/L    Anion Gap 16 10 - 20 mmol/L    Urea Nitrogen 67 (H) 6 - 23 mg/dL    Creatinine 1.77 (H) 0.50 - 1.05 mg/dL    eGFR 27 (L) >60 mL/min/1.73m*2    Calcium 8.2 (L) 8.6 - 10.3 mg/dL    Albumin 2.8 (L) 3.4 - 5.0 g/dL    Alkaline Phosphatase 128 33 - 136 U/L    Total Protein 5.6 (L) 6.4 - 8.2 g/dL    AST 28 9 - 39 U/L    Bilirubin, Total 0.4 0.0 - 1.2 mg/dL    ALT 16 7 - 45 U/L   B-type natriuretic peptide   Result Value Ref Range     (H) 0 - 99 pg/mL   Morphology   Result Value Ref Range    RBC Morphology See Below     Hypochromia Marked     RBC Fragments Few     Ovalocytes Few     Teardrop Cells Few    Protime-INR   Result Value Ref Range    Protime 16.9 (H) 9.8 - 12.8 seconds    INR 1.5 (H) 0.9 - 1.1   Prepare RBC: 1 Units   Result Value Ref Range    PRODUCT CODE W5562N30     Unit Number [id number]     Unit ABO O     Unit RH POS     XM INTEP COMP     Dispense Status TR     Blood Expiration Date 7/31/2024 11:59:00 PM EDT     PRODUCT BLOOD TYPE 5100     UNIT VOLUME 350    Lactate   Result Value Ref Range    Lactate 4.6 (HH) 0.4 - 2.0 mmol/L   Blood Culture    Specimen: Peripheral Venipuncture; Blood culture   Result Value Ref Range    Blood Culture Loaded on Instrument - Culture in progress    Blood Culture    Specimen: Peripheral Venipuncture; Blood culture   Result Value Ref Range    Blood Culture Loaded on Instrument - Culture in progress    MRSA Surveillance for Vancomycin De-escalation, PCR    Specimen: Anterior Nares; Swab   Result Value Ref Range    MRSA PCR Not Detected Not Detected   VERIFY ABO/Rh Group Test   Result Value Ref Range    ABO TYPE O     Rh TYPE POS    BLOOD GAS VENOUS FULL PANEL   Result Value Ref Range    POCT pH, Venous 7.40 7.33 - 7.43 pH    POCT  pCO2, Venous 43 41 - 51 mm Hg    POCT pO2, Venous 34 (L) 35 - 45 mm Hg    POCT SO2, Venous 50 45 - 75 %    POCT Oxy Hemoglobin, Venous 48.4 45.0 - 75.0 %    POCT Hematocrit Calculated, Venous 19.0 (L) 36.0 - 46.0 %    POCT Sodium, Venous 153 (H) 136 - 145 mmol/L    POCT Potassium, Venous 3.4 (L) 3.5 - 5.3 mmol/L    POCT Chloride, Venous 120 (H) 98 - 107 mmol/L    POCT Ionized Calicum, Venous 1.24 1.10 - 1.33 mmol/L    POCT Glucose, Venous 79 74 - 99 mg/dL    POCT Lactate, Venous 4.3 (HH) 0.4 - 2.0 mmol/L    POCT Base Excess, Venous 1.6 -2.0 - 3.0 mmol/L    POCT HCO3 Calculated, Venous 26.6 (H) 22.0 - 26.0 mmol/L    POCT Hemoglobin, Venous 6.3 (LL) 12.0 - 16.0 g/dL    POCT Anion Gap, Venous 10.0 10.0 - 25.0 mmol/L    Patient Temperature 37.0 degrees Celsius    FiO2 21 %   POCT GLUCOSE   Result Value Ref Range    POCT Glucose 65 (L) 74 - 99 mg/dL   POCT GLUCOSE   Result Value Ref Range    POCT Glucose 67 (L) 74 - 99 mg/dL   Blood Gas Lactic Acid, Venous   Result Value Ref Range    POCT Lactate, Venous 4.2 (HH) 0.4 - 2.0 mmol/L   Lactate   Result Value Ref Range    Lactate 4.6 (HH) 0.4 - 2.0 mmol/L   Iron and TIBC   Result Value Ref Range    Iron 45 35 - 150 ug/dL    UIBC 89 (L) 110 - 370 ug/dL    TIBC 134 (L) 240 - 445 ug/dL    % Saturation 34 25 - 45 %   POCT GLUCOSE   Result Value Ref Range    POCT Glucose 121 (H) 74 - 99 mg/dL   POCT GLUCOSE   Result Value Ref Range    POCT Glucose 133 (H) 74 - 99 mg/dL   Lactate   Result Value Ref Range    Lactate 5.0 (HH) 0.4 - 2.0 mmol/L   Lactate   Result Value Ref Range    Lactate 4.9 (HH) 0.4 - 2.0 mmol/L   CBC   Result Value Ref Range    WBC 14.6 (H) 4.4 - 11.3 x10*3/uL    nRBC 0.0 0.0 - 0.0 /100 WBCs    RBC 3.31 (L) 4.00 - 5.20 x10*6/uL    Hemoglobin 8.5 (L) 12.0 - 16.0 g/dL    Hematocrit 28.2 (L) 36.0 - 46.0 %    MCV 85 80 - 100 fL    MCH 25.7 (L) 26.0 - 34.0 pg    MCHC 30.1 (L) 32.0 - 36.0 g/dL    RDW 20.7 (H) 11.5 - 14.5 %    Platelets 183 150 - 450 x10*3/uL   Basic  metabolic panel   Result Value Ref Range    Glucose 90 74 - 99 mg/dL    Sodium 151 (H) 136 - 145 mmol/L    Potassium 4.2 3.5 - 5.3 mmol/L    Chloride 117 (H) 98 - 107 mmol/L    Bicarbonate 23 21 - 32 mmol/L    Anion Gap 15 10 - 20 mmol/L    Urea Nitrogen 58 (H) 6 - 23 mg/dL    Creatinine 1.54 (H) 0.50 - 1.05 mg/dL    eGFR 32 (L) >60 mL/min/1.73m*2    Calcium 8.0 (L) 8.6 - 10.3 mg/dL      XR chest 1 view    Result Date: 7/20/2024  STUDY: Chest Radiograph;  7/20/24 at 10:55 AM INDICATION: Heart failure. COMPARISON: None available. ACCESSION NUMBER(S): [id number] ORDERING CLINICIAN: LAUREL COLEMAN TECHNIQUE:  Frontal chest was obtained at 1055 hours. FINDINGS: CARDIOMEDIASTINAL SILHOUETTE: Cardiomediastinal silhouette is normal in size and configuration. Vascular calcifications are identified.  LUNGS: Chronic interstitial lung changes are identified.  There is elevation of the right hemidiaphragm.  Left-sided cardiac pacer device is seen. Left lower lobe airspace disease is seen.   ABDOMEN: No remarkable upper abdominal findings.  BONES: No acute osseous changes.Generalized osteopenia is noted.    Left lower lobe airspace disease is seen, concerning for pneumonia. Signed by Jessie Mulligan MD      Medications  albuterol, 3 mL, nebulization, TID  amiodarone, 200 mg, oral, Daily  azithromycin, 500 mg, intravenous, q24h  cefTRIAXone, 2 g, intravenous, q24h  heparin (porcine), 5,000 Units, subcutaneous, q8h MATY  metoprolol succinate XL, 25 mg, oral, Daily  polyethylene glycol, 17 g, oral, Daily  sertraline, 50 mg, oral, Daily       PRN medications: acetaminophen **OR** acetaminophen **OR** acetaminophen, acetaminophen **OR** acetaminophen **OR** acetaminophen, albuterol, dextrose, dextrose, glucagon, glucagon, ondansetron **OR** ondansetron     Assessment/Plan   Principal Problem:    Pneumonia due to infectious organism, unspecified laterality, unspecified part of lung      89-year-old -American female, who is known  to history of coronary artery disease, moderate MR, hypertension, chronic diastolic heart failure, hyperlipidemia, GERD, CKD 3, atrial fibrillation, dual-chamber pacemaker, sent to the emergency department with abnormal labs and altered mental status.  Pneumonia, left lower lobe pneumonia, on antibiotics,.  Hyponatremia MARTINEZ, on IV fluids, again the patient has history of chronic diastolic heart failure we will monitor.  We will also get a nephrology consult.  History of coronary artery disease, heart failure A-fib a flutter dual-chamber pacemaker, we will get a cardiology evaluation.  History of hypertension continue to monitor.  Speech therapy.  I had a long discussion with the daughter who is the POA, and according to her she is full code, I we will also get her to introduced to the palliative care,.  DVT prophylaxis           I spent 75 minutes in the professional and overall care of this patient.    Kylie Bethea MD

## 2024-07-22 ENCOUNTER — APPOINTMENT (OUTPATIENT)
Dept: RADIOLOGY | Facility: HOSPITAL | Age: 89
DRG: 194 | End: 2024-07-22
Payer: COMMERCIAL

## 2024-07-22 LAB
ANION GAP SERPL CALC-SCNC: 16 MMOL/L (ref 10–20)
APPEARANCE UR: CLEAR
ATRIAL RATE: 70 BPM
BACTERIA #/AREA URNS AUTO: ABNORMAL /HPF
BILIRUB UR STRIP.AUTO-MCNC: NEGATIVE MG/DL
BUN SERPL-MCNC: 52 MG/DL (ref 6–23)
CALCIUM SERPL-MCNC: 7.9 MG/DL (ref 8.6–10.3)
CHLORIDE SERPL-SCNC: 116 MMOL/L (ref 98–107)
CHLORIDE UR-SCNC: 16 MMOL/L
CHLORIDE/CREATININE (MMOL/G) IN URINE: 20 MMOL/G CREAT (ref 38–318)
CO2 SERPL-SCNC: 20 MMOL/L (ref 21–32)
COLOR UR: YELLOW
CREAT SERPL-MCNC: 1.41 MG/DL (ref 0.5–1.05)
CREAT UR-MCNC: 79.6 MG/DL (ref 20–320)
EGFRCR SERPLBLD CKD-EPI 2021: 35 ML/MIN/1.73M*2
ERYTHROCYTE [DISTWIDTH] IN BLOOD BY AUTOMATED COUNT: 21.2 % (ref 11.5–14.5)
GLUCOSE BLD MANUAL STRIP-MCNC: 81 MG/DL (ref 74–99)
GLUCOSE SERPL-MCNC: 90 MG/DL (ref 74–99)
GLUCOSE UR STRIP.AUTO-MCNC: NORMAL MG/DL
HCT VFR BLD AUTO: 29.3 % (ref 36–46)
HGB BLD-MCNC: 9 G/DL (ref 12–16)
HOLD SPECIMEN: NORMAL
KETONES UR STRIP.AUTO-MCNC: NEGATIVE MG/DL
LACTATE SERPL-SCNC: 3.4 MMOL/L (ref 0.4–2)
LEUKOCYTE ESTERASE UR QL STRIP.AUTO: ABNORMAL
MCH RBC QN AUTO: 25.7 PG (ref 26–34)
MCHC RBC AUTO-ENTMCNC: 30.7 G/DL (ref 32–36)
MCV RBC AUTO: 84 FL (ref 80–100)
NITRITE UR QL STRIP.AUTO: NEGATIVE
NRBC BLD-RTO: 0 /100 WBCS (ref 0–0)
P AXIS: 69 DEGREES
P OFFSET: 198 MS
P ONSET: 165 MS
PH UR STRIP.AUTO: 6 [PH]
PLATELET # BLD AUTO: 170 X10*3/UL (ref 150–450)
POTASSIUM SERPL-SCNC: 3.3 MMOL/L (ref 3.5–5.3)
POTASSIUM UR-SCNC: 41 MMOL/L
POTASSIUM/CREAT UR-RTO: 52 MMOL/G CREAT
PR INTERVAL: 160 MS
PROT UR STRIP.AUTO-MCNC: ABNORMAL MG/DL
Q ONSET: 227 MS
QRS COUNT: 12 BEATS
QRS DURATION: 86 MS
QT INTERVAL: 532 MS
QTC CALCULATION(BAZETT): 574 MS
QTC FREDERICIA: 560 MS
R AXIS: 58 DEGREES
RBC # BLD AUTO: 3.5 X10*6/UL (ref 4–5.2)
RBC # UR STRIP.AUTO: NEGATIVE /UL
RBC #/AREA URNS AUTO: ABNORMAL /HPF
SODIUM SERPL-SCNC: 149 MMOL/L (ref 136–145)
SODIUM UR-SCNC: 10 MMOL/L
SODIUM/CREAT UR-RTO: 13 MMOL/G CREAT
SP GR UR STRIP.AUTO: 1.02
SQUAMOUS #/AREA URNS AUTO: ABNORMAL /HPF
T AXIS: 17 DEGREES
T OFFSET: 493 MS
UROBILINOGEN UR STRIP.AUTO-MCNC: NORMAL MG/DL
VENTRICULAR RATE: 70 BPM
WBC # BLD AUTO: 12.6 X10*3/UL (ref 4.4–11.3)
WBC #/AREA URNS AUTO: ABNORMAL /HPF

## 2024-07-22 PROCEDURE — 92610 EVALUATE SWALLOWING FUNCTION: CPT | Mod: GN

## 2024-07-22 PROCEDURE — 36415 COLL VENOUS BLD VENIPUNCTURE: CPT | Performed by: INTERNAL MEDICINE

## 2024-07-22 PROCEDURE — 99497 ADVNCD CARE PLAN 30 MIN: CPT | Performed by: NURSE PRACTITIONER

## 2024-07-22 PROCEDURE — 1100000001 HC PRIVATE ROOM DAILY

## 2024-07-22 PROCEDURE — 85027 COMPLETE CBC AUTOMATED: CPT | Performed by: INTERNAL MEDICINE

## 2024-07-22 PROCEDURE — 2500000004 HC RX 250 GENERAL PHARMACY W/ HCPCS (ALT 636 FOR OP/ED): Performed by: INTERNAL MEDICINE

## 2024-07-22 PROCEDURE — 81001 URINALYSIS AUTO W/SCOPE: CPT | Performed by: EMERGENCY MEDICINE

## 2024-07-22 PROCEDURE — 99221 1ST HOSP IP/OBS SF/LOW 40: CPT | Performed by: INTERNAL MEDICINE

## 2024-07-22 PROCEDURE — 74176 CT ABD & PELVIS W/O CONTRAST: CPT

## 2024-07-22 PROCEDURE — 87086 URINE CULTURE/COLONY COUNT: CPT | Mod: AHULAB | Performed by: EMERGENCY MEDICINE

## 2024-07-22 PROCEDURE — 82947 ASSAY GLUCOSE BLOOD QUANT: CPT

## 2024-07-22 PROCEDURE — 82436 ASSAY OF URINE CHLORIDE: CPT | Performed by: INTERNAL MEDICINE

## 2024-07-22 PROCEDURE — 83605 ASSAY OF LACTIC ACID: CPT | Performed by: INTERNAL MEDICINE

## 2024-07-22 PROCEDURE — 99223 1ST HOSP IP/OBS HIGH 75: CPT | Performed by: NURSE PRACTITIONER

## 2024-07-22 PROCEDURE — 2500000002 HC RX 250 W HCPCS SELF ADMINISTERED DRUGS (ALT 637 FOR MEDICARE OP, ALT 636 FOR OP/ED): Performed by: INTERNAL MEDICINE

## 2024-07-22 PROCEDURE — 94640 AIRWAY INHALATION TREATMENT: CPT

## 2024-07-22 PROCEDURE — 80048 BASIC METABOLIC PNL TOTAL CA: CPT | Performed by: INTERNAL MEDICINE

## 2024-07-22 PROCEDURE — 2500000001 HC RX 250 WO HCPCS SELF ADMINISTERED DRUGS (ALT 637 FOR MEDICARE OP): Performed by: INTERNAL MEDICINE

## 2024-07-22 RX ORDER — POTASSIUM CHLORIDE 20 MEQ/1
40 TABLET, EXTENDED RELEASE ORAL ONCE
Status: COMPLETED | OUTPATIENT
Start: 2024-07-22 | End: 2024-07-22

## 2024-07-22 RX ORDER — DEXTROSE MONOHYDRATE AND SODIUM CHLORIDE 5; .225 G/100ML; G/100ML
75 INJECTION, SOLUTION INTRAVENOUS CONTINUOUS
Status: DISCONTINUED | OUTPATIENT
Start: 2024-07-22 | End: 2024-07-23 | Stop reason: HOSPADM

## 2024-07-22 SDOH — ECONOMIC STABILITY: FOOD INSECURITY: WITHIN THE PAST 12 MONTHS, THE FOOD YOU BOUGHT JUST DIDN'T LAST AND YOU DIDN'T HAVE MONEY TO GET MORE.: NEVER TRUE

## 2024-07-22 SDOH — HEALTH STABILITY: MENTAL HEALTH
HOW OFTEN DO YOU NEED TO HAVE SOMEONE HELP YOU WHEN YOU READ INSTRUCTIONS, PAMPHLETS, OR OTHER WRITTEN MATERIAL FROM YOUR DOCTOR OR PHARMACY?: ALWAYS

## 2024-07-22 SDOH — ECONOMIC STABILITY: FOOD INSECURITY: WITHIN THE PAST 12 MONTHS, YOU WORRIED THAT YOUR FOOD WOULD RUN OUT BEFORE YOU GOT MONEY TO BUY MORE.: NEVER TRUE

## 2024-07-22 SDOH — ECONOMIC STABILITY: HOUSING INSECURITY: IN THE PAST 12 MONTHS, HOW MANY TIMES HAVE YOU MOVED WHERE YOU WERE LIVING?: 0

## 2024-07-22 SDOH — ECONOMIC STABILITY: HOUSING INSECURITY: AT ANY TIME IN THE PAST 12 MONTHS, WERE YOU HOMELESS OR LIVING IN A SHELTER (INCLUDING NOW)?: NO

## 2024-07-22 SDOH — ECONOMIC STABILITY: INCOME INSECURITY
HOW HARD IS IT FOR YOU TO PAY FOR THE VERY BASICS LIKE FOOD, HOUSING, MEDICAL CARE, AND HEATING?: PATIENT UNABLE TO ANSWER

## 2024-07-22 SDOH — HEALTH STABILITY: MENTAL HEALTH
STRESS IS WHEN SOMEONE FEELS TENSE, NERVOUS, ANXIOUS, OR CAN'T SLEEP AT NIGHT BECAUSE THEIR MIND IS TROUBLED. HOW STRESSED ARE YOU?: NOT AT ALL

## 2024-07-22 SDOH — SOCIAL STABILITY: SOCIAL INSECURITY
WITHIN THE LAST YEAR, HAVE YOU BEEN KICKED, HIT, SLAPPED, OR OTHERWISE PHYSICALLY HURT BY YOUR PARTNER OR EX-PARTNER?: NO

## 2024-07-22 SDOH — HEALTH STABILITY: MENTAL HEALTH: HOW MANY STANDARD DRINKS CONTAINING ALCOHOL DO YOU HAVE ON A TYPICAL DAY?: PATIENT DOES NOT DRINK

## 2024-07-22 SDOH — ECONOMIC STABILITY: TRANSPORTATION INSECURITY
IN THE PAST 12 MONTHS, HAS THE LACK OF TRANSPORTATION KEPT YOU FROM MEDICAL APPOINTMENTS OR FROM GETTING MEDICATIONS?: NO

## 2024-07-22 SDOH — SOCIAL STABILITY: SOCIAL NETWORK
IN A TYPICAL WEEK, HOW MANY TIMES DO YOU TALK ON THE PHONE WITH FAMILY, FRIENDS, OR NEIGHBORS?: MORE THAN THREE TIMES A WEEK

## 2024-07-22 SDOH — SOCIAL STABILITY: SOCIAL NETWORK: HOW OFTEN DO YOU GET TOGETHER WITH FRIENDS OR RELATIVES?: MORE THAN THREE TIMES A WEEK

## 2024-07-22 SDOH — SOCIAL STABILITY: SOCIAL INSECURITY: WITHIN THE LAST YEAR, HAVE YOU BEEN AFRAID OF YOUR PARTNER OR EX-PARTNER?: NO

## 2024-07-22 SDOH — HEALTH STABILITY: MENTAL HEALTH: HOW OFTEN DO YOU HAVE A DRINK CONTAINING ALCOHOL?: NEVER

## 2024-07-22 SDOH — SOCIAL STABILITY: SOCIAL INSECURITY: WITHIN THE LAST YEAR, HAVE YOU BEEN HUMILIATED OR EMOTIONALLY ABUSED IN OTHER WAYS BY YOUR PARTNER OR EX-PARTNER?: NO

## 2024-07-22 SDOH — ECONOMIC STABILITY: INCOME INSECURITY: IN THE LAST 12 MONTHS, WAS THERE A TIME WHEN YOU WERE NOT ABLE TO PAY THE MORTGAGE OR RENT ON TIME?: NO

## 2024-07-22 SDOH — SOCIAL STABILITY: SOCIAL NETWORK: ARE YOU MARRIED, WIDOWED, DIVORCED, SEPARATED, NEVER MARRIED, OR LIVING WITH A PARTNER?: MARRIED

## 2024-07-22 SDOH — SOCIAL STABILITY: SOCIAL INSECURITY
WITHIN THE LAST YEAR, HAVE TO BEEN RAPED OR FORCED TO HAVE ANY KIND OF SEXUAL ACTIVITY BY YOUR PARTNER OR EX-PARTNER?: NO

## 2024-07-22 SDOH — ECONOMIC STABILITY: INCOME INSECURITY: IN THE PAST 12 MONTHS, HAS THE ELECTRIC, GAS, OIL, OR WATER COMPANY THREATENED TO SHUT OFF SERVICE IN YOUR HOME?: NO

## 2024-07-22 SDOH — SOCIAL STABILITY: SOCIAL NETWORK: HOW OFTEN DO YOU ATTENT MEETINGS OF THE CLUB OR ORGANIZATION YOU BELONG TO?: NEVER

## 2024-07-22 SDOH — ECONOMIC STABILITY: TRANSPORTATION INSECURITY
IN THE PAST 12 MONTHS, HAS LACK OF TRANSPORTATION KEPT YOU FROM MEETINGS, WORK, OR FROM GETTING THINGS NEEDED FOR DAILY LIVING?: NO

## 2024-07-22 SDOH — HEALTH STABILITY: MENTAL HEALTH: HOW OFTEN DO YOU HAVE 6 OR MORE DRINKS ON ONE OCCASION?: NEVER

## 2024-07-22 SDOH — SOCIAL STABILITY: SOCIAL NETWORK
DO YOU BELONG TO ANY CLUBS OR ORGANIZATIONS SUCH AS CHURCH GROUPS UNIONS, FRATERNAL OR ATHLETIC GROUPS, OR SCHOOL GROUPS?: NO

## 2024-07-22 SDOH — HEALTH STABILITY: PHYSICAL HEALTH: ON AVERAGE, HOW MANY MINUTES DO YOU ENGAGE IN EXERCISE AT THIS LEVEL?: 0 MIN

## 2024-07-22 SDOH — HEALTH STABILITY: PHYSICAL HEALTH: ON AVERAGE, HOW MANY DAYS PER WEEK DO YOU ENGAGE IN MODERATE TO STRENUOUS EXERCISE (LIKE A BRISK WALK)?: 0 DAYS

## 2024-07-22 SDOH — SOCIAL STABILITY: SOCIAL NETWORK: HOW OFTEN DO YOU ATTEND CHURCH OR RELIGIOUS SERVICES?: NEVER

## 2024-07-22 ASSESSMENT — PAIN SCALES - GENERAL
PAINLEVEL_OUTOF10: 0 - NO PAIN

## 2024-07-22 ASSESSMENT — COGNITIVE AND FUNCTIONAL STATUS - GENERAL
EATING MEALS: TOTAL
MOVING TO AND FROM BED TO CHAIR: TOTAL
CLIMB 3 TO 5 STEPS WITH RAILING: TOTAL
HELP NEEDED FOR BATHING: TOTAL
PERSONAL GROOMING: TOTAL
WALKING IN HOSPITAL ROOM: TOTAL
TOILETING: TOTAL
MOBILITY SCORE: 6
DRESSING REGULAR UPPER BODY CLOTHING: TOTAL
DRESSING REGULAR LOWER BODY CLOTHING: TOTAL
TURNING FROM BACK TO SIDE WHILE IN FLAT BAD: TOTAL
MOVING FROM LYING ON BACK TO SITTING ON SIDE OF FLAT BED WITH BEDRAILS: TOTAL
STANDING UP FROM CHAIR USING ARMS: TOTAL
DAILY ACTIVITIY SCORE: 6

## 2024-07-22 ASSESSMENT — ENCOUNTER SYMPTOMS
NAUSEA: 0
ABDOMINAL PAIN: 0

## 2024-07-22 ASSESSMENT — PAIN - FUNCTIONAL ASSESSMENT
PAIN_FUNCTIONAL_ASSESSMENT: 0-10
PAIN_FUNCTIONAL_ASSESSMENT: 0-10

## 2024-07-22 ASSESSMENT — LIFESTYLE VARIABLES
SKIP TO QUESTIONS 9-10: 1
AUDIT-C TOTAL SCORE: 0

## 2024-07-22 NOTE — CARE PLAN
The patient's goals for the shift include      The clinical goals for the shift include HDS      Problem: Fall/Injury  Goal: Not fall by end of shift  Outcome: Progressing  Goal: Be free from injury by end of the shift  Outcome: Progressing  Goal: Verbalize understanding of personal risk factors for fall in the hospital  Outcome: Progressing  Goal: Verbalize understanding of risk factor reduction measures to prevent injury from fall in the home  Outcome: Progressing  Goal: Use assistive devices by end of the shift  Outcome: Progressing  Goal: Pace activities to prevent fatigue by end of the shift  Outcome: Progressing     Problem: Respiratory  Goal: Clear secretions with interventions this shift  Outcome: Progressing  Goal: Minimize anxiety/maximize coping throughout shift  Outcome: Progressing  Goal: Minimal/no exertional discomfort or dyspnea this shift  Outcome: Progressing  Goal: No signs of respiratory distress (eg. Use of accessory muscles. Peds grunting)  Outcome: Progressing  Goal: Patent airway maintained this shift  Outcome: Progressing  Goal: Tolerate mechanical ventilation evidenced by VS/agitation level this shift  Outcome: Progressing  Goal: Tolerate pulmonary toileting this shift  Outcome: Progressing  Goal: Verbalize decreased shortness of breath this shift  Outcome: Progressing  Goal: Wean oxygen to maintain O2 saturation per order/standard this shift  Outcome: Progressing  Goal: Increase self care and/or family involvement in next 24 hours  Outcome: Progressing     Problem: Pain  Goal: Takes deep breaths with improved pain control throughout the shift  Outcome: Progressing  Goal: Turns in bed with improved pain control throughout the shift  Outcome: Progressing  Goal: Walks with improved pain control throughout the shift  Outcome: Progressing  Goal: Performs ADL's with improved pain control throughout shift  Outcome: Progressing  Goal: Participates in PT with improved pain control throughout the  shift  Outcome: Progressing  Goal: Free from opioid side effects throughout the shift  Outcome: Progressing  Goal: Free from acute confusion related to pain meds throughout the shift  Outcome: Progressing

## 2024-07-22 NOTE — PROGRESS NOTES
"Chichi Nice is a 89 y.o. female on day 2 of admission presenting with Pneumonia due to infectious organism, unspecified laterality, unspecified part of lung.    Subjective       In and examined, she is confused, sleepy,    Objective     Physical Exam  Confused  HEENT unremarkable.  Neck no JVD.  Heart S1-S2 with pacemaker.  Lungs reduced entry.  Abdomen soft nontender.  Legs no edema.  Generalized weakness  Last Recorded Vitals  Blood pressure 126/55, pulse 73, temperature 36.6 °C (97.8 °F), temperature source Temporal, resp. rate 18, height 1.702 m (5' 7\"), weight 53.6 kg (118 lb 3.2 oz), SpO2 97%.  Intake/Output last 3 Shifts:  I/O last 3 completed shifts:  In: 929 (17.3 mL/kg) [P.O.:50; I.V.:779 (14.5 mL/kg); IV Piggyback:100]  Out: 650 (12.1 mL/kg) [Urine:650 (0.3 mL/kg/hr)]  Weight: 53.6 kg     Relevant Results  Results for orders placed or performed during the hospital encounter of 07/20/24 (from the past 96 hour(s))   CBC and Auto Differential   Result Value Ref Range    WBC 12.9 (H) 4.4 - 11.3 x10*3/uL    nRBC 0.0 0.0 - 0.0 /100 WBCs    RBC 2.81 (L) 4.00 - 5.20 x10*6/uL    Hemoglobin 7.0 (L) 12.0 - 16.0 g/dL    Hematocrit 24.1 (L) 36.0 - 46.0 %    MCV 86 80 - 100 fL    MCH 24.9 (L) 26.0 - 34.0 pg    MCHC 29.0 (L) 32.0 - 36.0 g/dL    RDW 21.5 (H) 11.5 - 14.5 %    Platelets 192 150 - 450 x10*3/uL    Neutrophils % 87.7 40.0 - 80.0 %    Immature Granulocytes %, Automated 1.3 (H) 0.0 - 0.9 %    Lymphocytes % 6.4 13.0 - 44.0 %    Monocytes % 4.2 2.0 - 10.0 %    Eosinophils % 0.3 0.0 - 6.0 %    Basophils % 0.1 0.0 - 2.0 %    Neutrophils Absolute 11.31 (H) 1.60 - 5.50 x10*3/uL    Immature Granulocytes Absolute, Automated 0.17 0.00 - 0.50 x10*3/uL    Lymphocytes Absolute 0.83 0.80 - 3.00 x10*3/uL    Monocytes Absolute 0.54 0.05 - 0.80 x10*3/uL    Eosinophils Absolute 0.04 0.00 - 0.40 x10*3/uL    Basophils Absolute 0.01 0.00 - 0.10 x10*3/uL   Type and Screen   Result Value Ref Range    ABO TYPE O     Rh TYPE POS  "    ANTIBODY SCREEN NEG    Comprehensive metabolic panel   Result Value Ref Range    Glucose 73 (L) 74 - 99 mg/dL    Sodium 153 (H) 136 - 145 mmol/L    Potassium 3.9 3.5 - 5.3 mmol/L    Chloride 117 (H) 98 - 107 mmol/L    Bicarbonate 24 21 - 32 mmol/L    Anion Gap 16 10 - 20 mmol/L    Urea Nitrogen 67 (H) 6 - 23 mg/dL    Creatinine 1.77 (H) 0.50 - 1.05 mg/dL    eGFR 27 (L) >60 mL/min/1.73m*2    Calcium 8.2 (L) 8.6 - 10.3 mg/dL    Albumin 2.8 (L) 3.4 - 5.0 g/dL    Alkaline Phosphatase 128 33 - 136 U/L    Total Protein 5.6 (L) 6.4 - 8.2 g/dL    AST 28 9 - 39 U/L    Bilirubin, Total 0.4 0.0 - 1.2 mg/dL    ALT 16 7 - 45 U/L   B-type natriuretic peptide   Result Value Ref Range     (H) 0 - 99 pg/mL   Morphology   Result Value Ref Range    RBC Morphology See Below     Hypochromia Marked     RBC Fragments Few     Ovalocytes Few     Teardrop Cells Few    Protime-INR   Result Value Ref Range    Protime 16.9 (H) 9.8 - 12.8 seconds    INR 1.5 (H) 0.9 - 1.1   Prepare RBC: 1 Units   Result Value Ref Range    PRODUCT CODE L8241K79     Unit Number [id number]     Unit ABO O     Unit RH POS     XM INTEP COMP     Dispense Status TR     Blood Expiration Date 7/31/2024 11:59:00 PM EDT     PRODUCT BLOOD TYPE 5100     UNIT VOLUME 350    ECG 12 lead   Result Value Ref Range    Ventricular Rate 70 BPM    Atrial Rate 70 BPM    NC Interval 160 ms    QRS Duration 86 ms    QT Interval 532 ms    QTC Calculation(Bazett) 574 ms    P Axis 69 degrees    R Axis 58 degrees    T Axis 17 degrees    QRS Count 12 beats    Q Onset 227 ms    P Onset 165 ms    P Offset 198 ms    T Offset 493 ms    QTC Fredericia 560 ms   Lactate   Result Value Ref Range    Lactate 4.6 (HH) 0.4 - 2.0 mmol/L   Blood Culture    Specimen: Peripheral Venipuncture; Blood culture   Result Value Ref Range    Blood Culture No growth at 1 day    Blood Culture    Specimen: Peripheral Venipuncture; Blood culture   Result Value Ref Range    Blood Culture No growth at 1 day     MRSA Surveillance for Vancomycin De-escalation, PCR    Specimen: Anterior Nares; Swab   Result Value Ref Range    MRSA PCR Not Detected Not Detected   VERIFY ABO/Rh Group Test   Result Value Ref Range    ABO TYPE O     Rh TYPE POS    BLOOD GAS VENOUS FULL PANEL   Result Value Ref Range    POCT pH, Venous 7.40 7.33 - 7.43 pH    POCT pCO2, Venous 43 41 - 51 mm Hg    POCT pO2, Venous 34 (L) 35 - 45 mm Hg    POCT SO2, Venous 50 45 - 75 %    POCT Oxy Hemoglobin, Venous 48.4 45.0 - 75.0 %    POCT Hematocrit Calculated, Venous 19.0 (L) 36.0 - 46.0 %    POCT Sodium, Venous 153 (H) 136 - 145 mmol/L    POCT Potassium, Venous 3.4 (L) 3.5 - 5.3 mmol/L    POCT Chloride, Venous 120 (H) 98 - 107 mmol/L    POCT Ionized Calicum, Venous 1.24 1.10 - 1.33 mmol/L    POCT Glucose, Venous 79 74 - 99 mg/dL    POCT Lactate, Venous 4.3 (HH) 0.4 - 2.0 mmol/L    POCT Base Excess, Venous 1.6 -2.0 - 3.0 mmol/L    POCT HCO3 Calculated, Venous 26.6 (H) 22.0 - 26.0 mmol/L    POCT Hemoglobin, Venous 6.3 (LL) 12.0 - 16.0 g/dL    POCT Anion Gap, Venous 10.0 10.0 - 25.0 mmol/L    Patient Temperature 37.0 degrees Celsius    FiO2 21 %   POCT GLUCOSE   Result Value Ref Range    POCT Glucose 65 (L) 74 - 99 mg/dL   POCT GLUCOSE   Result Value Ref Range    POCT Glucose 67 (L) 74 - 99 mg/dL   Blood Gas Lactic Acid, Venous   Result Value Ref Range    POCT Lactate, Venous 4.2 (HH) 0.4 - 2.0 mmol/L   Lactate   Result Value Ref Range    Lactate 4.6 (HH) 0.4 - 2.0 mmol/L   Iron and TIBC   Result Value Ref Range    Iron 45 35 - 150 ug/dL    UIBC 89 (L) 110 - 370 ug/dL    TIBC 134 (L) 240 - 445 ug/dL    % Saturation 34 25 - 45 %   POCT GLUCOSE   Result Value Ref Range    POCT Glucose 121 (H) 74 - 99 mg/dL   POCT GLUCOSE   Result Value Ref Range    POCT Glucose 133 (H) 74 - 99 mg/dL   Lactate   Result Value Ref Range    Lactate 5.0 (HH) 0.4 - 2.0 mmol/L   Lactate   Result Value Ref Range    Lactate 4.9 (HH) 0.4 - 2.0 mmol/L   CBC   Result Value Ref Range    WBC  14.6 (H) 4.4 - 11.3 x10*3/uL    nRBC 0.0 0.0 - 0.0 /100 WBCs    RBC 3.31 (L) 4.00 - 5.20 x10*6/uL    Hemoglobin 8.5 (L) 12.0 - 16.0 g/dL    Hematocrit 28.2 (L) 36.0 - 46.0 %    MCV 85 80 - 100 fL    MCH 25.7 (L) 26.0 - 34.0 pg    MCHC 30.1 (L) 32.0 - 36.0 g/dL    RDW 20.7 (H) 11.5 - 14.5 %    Platelets 183 150 - 450 x10*3/uL   Basic metabolic panel   Result Value Ref Range    Glucose 90 74 - 99 mg/dL    Sodium 151 (H) 136 - 145 mmol/L    Potassium 4.2 3.5 - 5.3 mmol/L    Chloride 117 (H) 98 - 107 mmol/L    Bicarbonate 23 21 - 32 mmol/L    Anion Gap 15 10 - 20 mmol/L    Urea Nitrogen 58 (H) 6 - 23 mg/dL    Creatinine 1.54 (H) 0.50 - 1.05 mg/dL    eGFR 32 (L) >60 mL/min/1.73m*2    Calcium 8.0 (L) 8.6 - 10.3 mg/dL   Basic Metabolic Panel   Result Value Ref Range    Glucose 116 (H) 74 - 99 mg/dL    Sodium 150 (H) 136 - 145 mmol/L    Potassium 3.7 3.5 - 5.3 mmol/L    Chloride 115 (H) 98 - 107 mmol/L    Bicarbonate 24 21 - 32 mmol/L    Anion Gap 15 10 - 20 mmol/L    Urea Nitrogen 58 (H) 6 - 23 mg/dL    Creatinine 1.54 (H) 0.50 - 1.05 mg/dL    eGFR 32 (L) >60 mL/min/1.73m*2    Calcium 8.0 (L) 8.6 - 10.3 mg/dL   Lactate   Result Value Ref Range    Lactate 4.7 (HH) 0.4 - 2.0 mmol/L   Urinalysis with Reflex Culture and Microscopic   Result Value Ref Range    Color, Urine Yellow Light-Yellow, Yellow, Dark-Yellow    Appearance, Urine Clear Clear    Specific Gravity, Urine 1.022 1.005 - 1.035    pH, Urine 6.0 5.0, 5.5, 6.0, 6.5, 7.0, 7.5, 8.0    Protein, Urine 30 (1+) (A) NEGATIVE, 10 (TRACE), 20 (TRACE) mg/dL    Glucose, Urine Normal Normal mg/dL    Blood, Urine NEGATIVE NEGATIVE    Ketones, Urine NEGATIVE NEGATIVE mg/dL    Bilirubin, Urine NEGATIVE NEGATIVE    Urobilinogen, Urine Normal Normal mg/dL    Nitrite, Urine NEGATIVE NEGATIVE    Leukocyte Esterase, Urine 75 Nida/µL (A) NEGATIVE   Urine electrolytes   Result Value Ref Range    Sodium, Urine Random 10 mmol/L    Sodium/Creatinine Ratio 13 Not established. mmol/g Creat     Potassium, Urine Random 41 mmol/L    Potassium/Creatinine Ratio 52 Not established mmol/g Creat    Chloride, Urine Random 16 mmol/L    Chloride/Creatinine Ratio 20 (L) 38 - 318 mmol/g creat    Creatinine, Urine Random 79.6 20.0 - 320.0 mg/dL   Microscopic Only, Urine   Result Value Ref Range    WBC, Urine 1-5 1-5, NONE /HPF    RBC, Urine NONE NONE, 1-2, 3-5 /HPF    Squamous Epithelial Cells, Urine 1-9 (SPARSE) Reference range not established. /HPF    Bacteria, Urine 1+ (A) NONE SEEN /HPF   CBC   Result Value Ref Range    WBC 12.6 (H) 4.4 - 11.3 x10*3/uL    nRBC 0.0 0.0 - 0.0 /100 WBCs    RBC 3.50 (L) 4.00 - 5.20 x10*6/uL    Hemoglobin 9.0 (L) 12.0 - 16.0 g/dL    Hematocrit 29.3 (L) 36.0 - 46.0 %    MCV 84 80 - 100 fL    MCH 25.7 (L) 26.0 - 34.0 pg    MCHC 30.7 (L) 32.0 - 36.0 g/dL    RDW 21.2 (H) 11.5 - 14.5 %    Platelets 170 150 - 450 x10*3/uL   Basic Metabolic Panel   Result Value Ref Range    Glucose 90 74 - 99 mg/dL    Sodium 149 (H) 136 - 145 mmol/L    Potassium 3.3 (L) 3.5 - 5.3 mmol/L    Chloride 116 (H) 98 - 107 mmol/L    Bicarbonate 20 (L) 21 - 32 mmol/L    Anion Gap 16 10 - 20 mmol/L    Urea Nitrogen 52 (H) 6 - 23 mg/dL    Creatinine 1.41 (H) 0.50 - 1.05 mg/dL    eGFR 35 (L) >60 mL/min/1.73m*2    Calcium 7.9 (L) 8.6 - 10.3 mg/dL        Medications:  albuterol, 3 mL, nebulization, TID  amiodarone, 200 mg, oral, Daily  azithromycin, 500 mg, intravenous, q24h  cefTRIAXone, 2 g, intravenous, q24h  heparin (porcine), 5,000 Units, subcutaneous, q8h MATY  metoprolol succinate XL, 25 mg, oral, Daily  polyethylene glycol, 17 g, oral, Daily  potassium chloride CR, 40 mEq, oral, Once  sertraline, 50 mg, oral, Daily      PRN medications: acetaminophen **OR** acetaminophen **OR** acetaminophen, acetaminophen **OR** acetaminophen **OR** acetaminophen, albuterol, dextrose, dextrose, glucagon, glucagon, ondansetron **OR** ondansetron    ECG 12 lead    Result Date: 7/22/2024  Atrial-paced rhythm Low voltage QRS Nonspecific  T wave abnormality Prolonged QT Abnormal ECG When compared with ECG of 09-APR-2013 09:44, Electronic atrial pacemaker has replaced Sinus rhythm QRS voltage has decreased QT has lengthened    US renal complete    Result Date: 7/21/2024  Interpreted By:  Christiane Perez, STUDY: US RENAL COMPLETE; 7/21/2024 12:20 pm   INDICATION: Signs/Symptoms:MARTINEZ on top of CKD stage III, rule out obstruction.   COMPARISON: None.   ACCESSION NUMBER(S): [id number]   ORDERING CLINICIAN: KARL LOZANO   TECHNIQUE: Grayscale and color Doppler imaging of the kidneys.   FINDINGS: The right kidney measures 11.1 cm.   The left kidney measures 10.7 cm. In the mid left renal cortex there is a 1.5 cm cyst   There is no shadowing calculus, hydronephrosis, or solid mass identified. The renal cortical thickness and echogenicity is normal.   Limited bladder evaluation: Urinary bladder has echogenic layering material possibly debris, clot, or stones measuring 4.7 x 2.6 x 3.8 cm. The material does not shadow.   Incidental There is ascites. There are liver masses incidentally noted. In the right lobe of the liver there is a 10.6 x 8.6 by 8.6 cm vascular mass. In the left lobe of the liver there is a heterogeneous 6.2 x 4.6 x 6.4 cm solid mass with necrosis.       1. No hydronephrosis. Incidental note is made of a left renal cyst 2. Layering echogenic material in the urinary bladder could represent debris, clot, or nonshadowing stones. 3. At least 2 large masses are seen in the liver measuring 10.6 cm in the right lobe of the liver and 6.4 cm in the left lobe of the liver. A CT of the abdomen and pelvis using intravenous contrast would be helpful for further evaluation of the hepatic masses.   .   MACRO: None.   Signed by: Christiane Perez 7/21/2024 12:28 PM Dictation workstation:   [id number]    XR chest 1 view    Result Date: 7/20/2024  STUDY: Chest Radiograph;  7/20/24 at 10:55 AM INDICATION: Heart failure. COMPARISON: None available. ACCESSION  NUMBER(S): [id number] ORDERING CLINICIAN: LAUREL COLEMAN TECHNIQUE:  Frontal chest was obtained at 1055 hours. FINDINGS: CARDIOMEDIASTINAL SILHOUETTE: Cardiomediastinal silhouette is normal in size and configuration. Vascular calcifications are identified.  LUNGS: Chronic interstitial lung changes are identified.  There is elevation of the right hemidiaphragm.  Left-sided cardiac pacer device is seen. Left lower lobe airspace disease is seen.   ABDOMEN: No remarkable upper abdominal findings.  BONES: No acute osseous changes.Generalized osteopenia is noted.    Left lower lobe airspace disease is seen, concerning for pneumonia. Signed by Jessie Mulligan MD      Assessment/Plan   Principal Problem:    Pneumonia due to infectious organism, unspecified laterality, unspecified part of lung    89-year-old -American female, who is known to history of coronary artery disease, moderate MR, hypertension, chronic diastolic heart failure, hyperlipidemia, GERD, CKD 3, atrial fibrillation, dual-chamber pacemaker, sent to the emergency department with abnormal labs and altered mental status.  Pneumonia, left lower lobe pneumonia, on antibiotics,.  Hyponatremia MARTINEZ, on IV fluids, again the patient has history of chronic diastolic heart failure we will monitor.  We will also get a nephrology consult.  History of coronary artery disease, heart failure A-fib a flutter dual-chamber pacemaker, we will get a cardiology evaluation.  History of hypertension continue to monitor.  Speech therapy.  Her appetite is very poor, p.o. intake is very poor,.  Also noted a ultrasound scan of the kidney report, it also shows hepatic masses, I ordered a CAT scan of the chest abdomen pelvis,.    I had a long discussion with the daughter who is the POA, and according to her she is full code, I we will also get her to introduced to the palliative care,.  DVT prophylaxis       I spent 25 minutes in the professional and overall care of this  patient.    Kylie Bethea MD

## 2024-07-22 NOTE — PROGRESS NOTES
Chichi Nice is a 89 y.o. female on day 2 of admission presenting with Pneumonia due to infectious organism, unspecified laterality, unspecified part of lung.       07/22/24 1244   Physical Activity   On average, how many days per week do you engage in moderate to strenuous exercise (like a brisk walk)? 0 days   On average, how many minutes do you engage in exercise at this level? 0 min   Financial Resource Strain   How hard is it for you to pay for the very basics like food, housing, medical care, and heating? Pt Unable   Housing Stability   In the last 12 months, was there a time when you were not able to pay the mortgage or rent on time? N   In the past 12 months, how many times have you moved where you were living? 0   At any time in the past 12 months, were you homeless or living in a shelter (including now)? N   Transportation Needs   In the past 12 months, has lack of transportation kept you from medical appointments or from getting medications? no   In the past 12 months, has lack of transportation kept you from meetings, work, or from getting things needed for daily living? No   Food Insecurity   Within the past 12 months, you worried that your food would run out before you got the money to buy more. Never true   Within the past 12 months, the food you bought just didn't last and you didn't have money to get more. Never true   Stress   Do you feel stress - tense, restless, nervous, or anxious, or unable to sleep at night because your mind is troubled all the time - these days? Not at all   Social Connections   In a typical week, how many times do you talk on the phone with family, friends, or neighbors? More than 3   How often do you get together with friends or relatives? More than 3   How often do you attend [employer]ian or Sabianist services? Never   Do you belong to any clubs or organizations such as [employer] groups, unions, fraternal or athletic groups, or school groups? No   How often do you attend  meetings of the clubs or organizations you belong to? Never   Are you , , , , never , or living with a partner?    Intimate Partner Violence   Within the last year, have you been afraid of your partner or ex-partner? No   Within the last year, have you been humiliated or emotionally abused in other ways by your partner or ex-partner? No   Within the last year, have you been kicked, hit, slapped, or otherwise physically hurt by your partner or ex-partner? No   Within the last year, have you been raped or forced to have any kind of sexual activity by your partner or ex-partner? No   Alcohol Use   Q1: How often do you have a drink containing alcohol? Never   Q2: How many drinks containing alcohol do you have on a typical day when you are drinking? None   Q3: How often do you have six or more drinks on one occasion? Never   Utilities   In the past 12 months has the electric, gas, oil, or water company threatened to shut off services in your home? No   Health Literacy   How often do you need to have someone help you when you read instructions, pamphlets, or other written material from your doctor or pharmacy? Always         Jeny Frederick RN

## 2024-07-22 NOTE — PROGRESS NOTES
"Speech-Language Pathology    Inpatient Speech-Language Pathology Clinical Swallow Evaluation    Patient Name: Chichi Nice  MRN: [id number]  : 1932  Today's Date: 24   Time Calculation  Start Time: 1040  Stop Time: 1120  Time Calculation (min): 40 min        RECOMMENDATIONS:    Solid Diet Recommendations : NPO  2.   Liquid Diet Recommendations: NPO  3.   Medication Administration Recommendations: Non Oral  4.    MBSS PENDING GOC DISCUSSION    Assessment:  Assessment Results: Patient alert with confusion. Hypophonia noted with reduced vocal intensity. Family members present at time of assessment. Family member reporting patient receiving minced moist diet with nectar liquids at SNF prior to admission. Patient given nectar liquids and purees. Bolus formation slowed with questionable delay in swallow onset. Patient exhibited change in vocal quality with delayed throat clear following multiple boluses. Concerned for aspiration. No indication of performance of instrumental assessment completed in medical record. No nursing home records available. Pharyngeal dysphagia suspected with aspiration. Will need to perform instrumental swallowing assessment via MBSS to further evaluate pharyngeal function unless GOC dictate otherwise. Unable to determine swallowing safety via bedside swallow assessment.     *Following evaluation medical team changing status to DNRCC with hospice consult.     Baseline Assessment:  Respiratory Status: Room air  History of Intubation: No        Behavior/Cognition: Alert, Confused, Doesn't follow directions  Patient Positioning: Upright in Bed  Baseline Vocal Quality: Weak    Oral-Motor Assessment:  Dentition: Edentulous  Oral Motor: Unable to Complete    Plan:  SLP Plan: No skilled SLP                   Goals:   N/A    General Visit Information:  Patient admitted: 24    Past Medical History: Per EMR; \"coronary artery disease, moderate MR, hypertension, chronic diastolic heart " "failure, hyperlipidemia, GERD, CKD 3, atrial fibrillation, dual-chamber pacemaker \" Dementia    Chief Complaint/Reason for admission: \"sent to the emergency department with abnormal labs and altered mental status.  Pneumonia, left lower lobe pneumonia\"    Relevant Imaging Results: CT chest 7/21> \"bilateral parenchymal infiltration\"    Living Environment: Nursing home (skilled/long-term)  Reason for Referral: assess for dysphagia 2/2 pna dx  Ordering Physician: Dr. Bethea  Current Diet : minced moist/mildly thick liquids    Pain:  Pain Assessment: 0-10  0-10 (Numeric) Pain Score: 0 - No pain    Treatment:    N/A    Inpatient Education:  Family educated on current swallow function and recommendation of Modified Barium swallow study and procedure  Family education results: gave verbal understanding                                           "

## 2024-07-22 NOTE — PROGRESS NOTES
PALLIATIVE CARE SOCIAL WORK NOTE     Hospice order received this afternoon. I met with pt's grandson Isael Gonsalez ([phone]) and his wife ([phone]) at bedside. Pt is a long term resident of [facility]. Family would like her to return to [facility] and to be followed by hospice. Offered choice of hospice agencies. Discussed the hospital's joint venture with [facility]. Agreeable to referral being made to [facility]. Isael and his wife drove in from [place] and were planning on driving back this afternoon. Referral to [facility] made through [facility]. They will reach out to Isael. Isael is aware that hospice consents can be signed virtually if he has already left [facility]. Once hospice consents have been signed, [facility] will see patient and assist with arrangements to get her back to [facility]. Will continue to follow as appropriate. Thank you.     ALEX Parmar

## 2024-07-22 NOTE — CONSULTS
"Inpatient consult to cardiology  Consult performed by: Sandi Horton, APRN-CNP  Consult ordered by: Kylie Bethea MD  Reason for consult: \"pacemaker,cad, MVR,\"      Cards: CC  History Of Present Illness:    89 year old with history of cognitive impairment, GIB, GERD, chronic kidney disease stage III, skin cancer, hyperlipidemia, hypertension, coronary artery disease, mitral regurgitation, heart failure, atrial tachycardia, atrial fibrillation(not on AC due to GI bleeding), and sinus node dysfunction with symptomatic bradycardia s/p PPM 04/08/2018 ([employer]) . She is maintained on amiodarone and metoprolol.  She presents to [facility] ER with altered mental status from her nursing home.  Of note she has had in and out workups for hospice due to advancing dementia.  Cardiology is now consulted for \"pacemaker,cad, MVR,\"    Afebrile, blood pressure 126/55, 97% on room air.  Notable labs on admission BUNs/CR 58/1.54, lactate 5.0, WBC 14.6, H&H 8.5/28.2, Albumin 2.8, , urinalysis shows trace leuks, urine culture pending, renal ultrasound showed No hydronephrosis. Incidental note is made of a left renal cyst  Layering echogenic material in the urinary bladder could represent debris, clot, or nonshadowing stones.At least 2 large masses are seen in the liver measuring 10.6 cm in the right lobe of the liver and 6.4 cm in the left lobe of the liver.  EKG shows atrial pacing chest x-ray showed left lower lobe airspace which is concerning for pneumonia.  She was started on her home medications started on IV antibiotics given 1000 mL fluid bolus.    Home cardiac medications include metoprolol succinate 25 mg daily, amiodarone 200 mg daily, and amlodipine 10 mg daily    Past Cardiology Tests (Last 3 Years):  TTE 4/2018   - Exam indication: Sustained atrial fibrillation   - The left ventricle is normal in size. Left ventricular systolic function is   normal. EF = 65 ± 5% (2D biplane) Indeterminate left " ventricular diastolic   dysfunction.   - The right ventricle is normal in size. Right ventricular systolic function is   normal.   - The left atrial cavity is mildly dilated.   - The right atrial cavity is dilated.   - There are no significant valvular abnormalities.   - Exam was compared with the prior  echocardiographic exam performed on   2014; MR was previously 2+.       Past Medical History:  She has no past medical history on file.    Past Surgical History:  She has no past surgical history on file.      Social History:  She has no history on file for tobacco use, alcohol use, and drug use.    Family History:  No family history on file.     Allergies:  Patient has no known allergies.    ROS:  10 point review of systems including (Constitutional, Eyes, ENMT, Respiratory, Cardiac, Gastrointestinal, Neurological, Psychiatric, and Hematologic) was performed and is otherwise negative.    Objective Data:  Last Recorded Vitals:  Vitals:    24 2300 24 0500 24 0743 24 0830   BP: (!) 138/37 (!) 105/44  126/55   BP Location:  Left arm     Patient Position:  Lying     Pulse:       Resp: 16 19  18   Temp: 36.3 °C (97.3 °F) 36.2 °C (97.2 °F)  36.6 °C (97.8 °F)   TempSrc: Oral Temporal  Temporal   SpO2: 91% 94% 94% 97%   Weight:       Height:         Medical Gas Therapy: None (Room air)  Weight  Av.6 kg (118 lb 3.2 oz)  Min: 53.6 kg (118 lb 3.2 oz)  Max: 53.6 kg (118 lb 3.2 oz)      LABS:  CMP:  Results from last 7 days   Lab Units 24  0616 24  1457 24  0602 24  1110   SODIUM mmol/L 149* 150* 151* 153*   POTASSIUM mmol/L 3.3* 3.7 4.2 3.9   CHLORIDE mmol/L 116* 115* 117* 117*   CO2 mmol/L * 24  24   ANION GAP mmol/L 16 15 15 16   BUN mg/dL 52* 58* 58* 67*   CREATININE mg/dL 1.41* 1.54* 1.54* 1.77*   EGFR mL/min/1.73m*2 35* 32* 32* 27*   ALBUMIN g/dL  --   --   --  2.8*   ALT U/L  --   --   --  16   AST U/L  --   --   --  28   BILIRUBIN TOTAL mg/dL  --   --   --   0.4     CBC:  Results from last 7 days   Lab Units 07/22/24  0616 07/21/24  0602 07/20/24  1110   WBC AUTO x10*3/uL 12.6* 14.6* 12.9*   HEMOGLOBIN g/dL 9.0* 8.5* 7.0*   HEMATOCRIT % 29.3* 28.2* 24.1*   PLATELETS AUTO x10*3/uL 170 183 192   MCV fL 84 85 86     COAG:   Results from last 7 days   Lab Units 07/20/24  1135   INR  1.5*     ABO:   ABO TYPE   Date Value Ref Range Status   07/20/2024 O  Final     HEME/ENDO:  Results from last 7 days   Lab Units 07/20/24  1631   IRON SATURATION % 34      CARDIAC:   Results from last 7 days   Lab Units 07/20/24  1110   BNP pg/mL 140*             Last I/O:    Intake/Output Summary (Last 24 hours) at 7/22/2024 0831  Last data filed at 7/21/2024 2335  Gross per 24 hour   Intake 100 ml   Output 450 ml   Net -350 ml     Net IO Since Admission: 2,437.33 mL [07/22/24 0831]      Imaging Results:  ECG 12 lead    Result Date: 7/22/2024  Atrial-paced rhythm Low voltage QRS Nonspecific T wave abnormality Prolonged QT Abnormal ECG When compared with ECG of 09-APR-2013 09:44, Electronic atrial pacemaker has replaced Sinus rhythm QRS voltage has decreased QT has lengthened    US renal complete    Result Date: 7/21/2024  Interpreted By:  Christiane Perez, STUDY: US RENAL COMPLETE; 7/21/2024 12:20 pm   INDICATION: Signs/Symptoms:MARTINEZ on top of CKD stage III, rule out obstruction.   COMPARISON: None.   ACCESSION NUMBER(S): [id number]   ORDERING CLINICIAN: KARL LOZANO   TECHNIQUE: Grayscale and color Doppler imaging of the kidneys.   FINDINGS: The right kidney measures 11.1 cm.   The left kidney measures 10.7 cm. In the mid left renal cortex there is a 1.5 cm cyst   There is no shadowing calculus, hydronephrosis, or solid mass identified. The renal cortical thickness and echogenicity is normal.   Limited bladder evaluation: Urinary bladder has echogenic layering material possibly debris, clot, or stones measuring 4.7 x 2.6 x 3.8 cm. The material does not shadow.   Incidental There is  ascites. There are liver masses incidentally noted. In the right lobe of the liver there is a 10.6 x 8.6 by 8.6 cm vascular mass. In the left lobe of the liver there is a heterogeneous 6.2 x 4.6 x 6.4 cm solid mass with necrosis.       1. No hydronephrosis. Incidental note is made of a left renal cyst 2. Layering echogenic material in the urinary bladder could represent debris, clot, or nonshadowing stones. 3. At least 2 large masses are seen in the liver measuring 10.6 cm in the right lobe of the liver and 6.4 cm in the left lobe of the liver. A CT of the abdomen and pelvis using intravenous contrast would be helpful for further evaluation of the hepatic masses.   .   MACRO: None.   Signed by: Christiane Perez 7/21/2024 12:28 PM Dictation workstation:   [id number]    XR chest 1 view    Result Date: 7/20/2024  STUDY: Chest Radiograph;  7/20/24 at 10:55 AM INDICATION: Heart failure. COMPARISON: None available. ACCESSION NUMBER(S): [id number] ORDERING CLINICIAN: LAUREL COLEMAN TECHNIQUE:  Frontal chest was obtained at 1055 hours. FINDINGS: CARDIOMEDIASTINAL SILHOUETTE: Cardiomediastinal silhouette is normal in size and configuration. Vascular calcifications are identified.  LUNGS: Chronic interstitial lung changes are identified.  There is elevation of the right hemidiaphragm.  Left-sided cardiac pacer device is seen. Left lower lobe airspace disease is seen.   ABDOMEN: No remarkable upper abdominal findings.  BONES: No acute osseous changes.Generalized osteopenia is noted.    Left lower lobe airspace disease is seen, concerning for pneumonia. Signed by Jessie Mulligan MD      Inpatient Medications:  Scheduled medications   Medication Dose Route Frequency    albuterol  3 mL nebulization TID    amiodarone  200 mg oral Daily    azithromycin  500 mg intravenous q24h    cefTRIAXone  2 g intravenous q24h    heparin (porcine)  5,000 Units subcutaneous q8h MATY    metoprolol succinate XL  25 mg oral Daily    polyethylene glycol   17 g oral Daily    sertraline  50 mg oral Daily     PRN medications   Medication    acetaminophen    Or    acetaminophen    Or    acetaminophen    acetaminophen    Or    acetaminophen    Or    acetaminophen    albuterol    dextrose    dextrose    glucagon    glucagon    ondansetron    Or    ondansetron     Continuous Medications   Medication Dose Last Rate    dextrose 5%-0.45 % sodium chloride  75 mL/hr 75 mL/hr (07/21/24 4992)       Outpatient Medications:  Current Outpatient Medications   Medication Instructions    0.9 % sodium chloride (sodium chloride, PF, 0.9%) injection 10 mL, intravenous, As needed    acetaminophen (TYLENOL) 325 mg, oral, Every 6 hours PRN    amiodarone (PACERONE) 200 mg, oral, Daily    amLODIPine (NORVASC) 10 mg, oral, Daily    divalproex sprinkle (DEPAKOTE SPRINKLE) 125 mg, oral, 2 times daily    fluticasone (Flonase) 50 mcg/actuation nasal spray 1 spray, Each Nostril, Nightly, Shake gently. Before first use, prime pump. After use, clean tip and replace cap.    furosemide (LASIX) 20 mg, oral, Daily    metoprolol succinate XL (TOPROL-XL) 25 mg, oral, Daily, Do not crush or chew.    omeprazole (PRILOSEC) 20 mg, oral, Daily before breakfast, Do not crush or chew.    potassium chloride (Klor-Con) 20 mEq packet 20 mEq, oral, Daily    sertraline (ZOLOFT) 50 mg, oral, Daily    traZODone (DESYREL) 50 mg, oral, Nightly       Physical Exam:  General: Frail elderly female alert and oriented 1-2  HEENT:  Pupils equal and reactive.  Normocephalic.  Moist mucosa.    Neck:  No thyromegaly.    Cardiovascular: A paced normal S1 and S2.  Pulmonary:  Clear to auscultation bilaterally.  Abdomen:  Soft. Non-tender.   Non-distended.  Positive bowel sounds.  Lower Extremities:  2+ pedal pulses.  +2 bilateral pitting edema  Neurologic:  Cranial nerves intact.  No focal deficit.   Skin: Skin warm and dry, normal skin turgor.   Psychiatric: Normal affect.     Assessment/Plan   Cards: [facility]  History Of Present Illness:  "   89 year old with history of cognitive impairment, GIB, GERD, chronic kidney disease stage III, skin cancer, hyperlipidemia, hypertension, coronary artery disease, mitral regurgitation, heart failure, atrial tachycardia, atrial fibrillation(not on AC due to GI bleeding), and sinus node dysfunction with symptomatic bradycardia s/p PPM 04/08/2018 ([employer]) . She is maintained on amiodarone and metoprolol.  She presents to [facility] ER with altered mental status from her nursing home.  Of note she has had in and out workups for hospice due to advancing dementia.  Cardiology is now consulted for \"pacemaker,cad, MVR,\"    Hx of HEFpEF-warm; looks euvolemic on exam; chest xray and chest CT with PN, LE edema likyl 2/2 poor PO intake and low protein levels   Hx of Sinus Node Dysfunction -S/P DDD-PPM BSX 4/12/2018  Hx of Paroxysmal Atrial fibrillation /flutter- Not on Ac due to recurrent falls and GI bleeds  Maintained on amiodarone 200 mg daily     Recs:  -c/w home medications  -no other cardiac testing necessary at this time  -supportive care per primary team for PN  -recommend goals of care discussion       Code Status:  Full Code    I spent 30 minutes in the professional and overall care of this patient.        Sandi Horton, APRN-CNP   "

## 2024-07-22 NOTE — PROGRESS NOTES
Chichi Nice is a 89 y.o. female on day 2 of admission presenting with Pneumonia due to infectious organism, unspecified laterality, unspecified part of lung.      Subjective   Seen and examined.   Her daughter is present at bedside. She is helping the patient eat breakfast.   ALLY.   Chart/labs/meds/notes/imaging/VS reviewed       Objective          Vitals 24HR  Heart Rate:  [73-74]   Temp:  [35.6 °C (96.1 °F)-36.6 °C (97.8 °F)]   Resp:  [15-19]   BP: ()/(37-56)   SpO2:  [90 %-100 %]     Intake/Output last 3 Shifts:    Intake/Output Summary (Last 24 hours) at 7/22/2024 1052  Last data filed at 7/22/2024 1028  Gross per 24 hour   Intake 1271 ml   Output 250 ml   Net 1021 ml       Physical Exam  General: Frail elderly female in NAD. Minimal communication.   HEENT:  Pupils equal and reactive. Normocephalic. Moist mucosa.    Neck:  No JVD.  Cardiovascular: Paced, normal S1 and S2.  Pulmonary:  Clear to auscultation bilaterally.  Abdomen:  Soft. Non-tender. Non-distended. Positive bowel sounds.  Lower Extremities: 2+ pedal pulses.   Neurologic:  Cranial nerves intact.  No focal deficit.   Skin: Skin warm and dry, normal skin turgor.   Psychiatric: Flat affect.    Scheduled Medications  albuterol, 3 mL, nebulization, TID  amiodarone, 200 mg, oral, Daily  azithromycin, 500 mg, intravenous, q24h  cefTRIAXone, 2 g, intravenous, q24h  heparin (porcine), 5,000 Units, subcutaneous, q8h MATY  metoprolol succinate XL, 25 mg, oral, Daily  polyethylene glycol, 17 g, oral, Daily  potassium chloride CR, 40 mEq, oral, Once  sertraline, 50 mg, oral, Daily      Continuous medications  dextrose 5%-0.2 % sod chloride, 75 mL/hr      PRN medications: acetaminophen **OR** acetaminophen **OR** acetaminophen, acetaminophen **OR** acetaminophen **OR** acetaminophen, albuterol, dextrose, dextrose, glucagon, glucagon, ondansetron **OR** ondansetron     Relevant Results  Results from last 7 days   Lab Units 07/22/24  0616 07/21/24  0602  07/20/24  1110   WBC AUTO x10*3/uL 12.6* 14.6* 12.9*   HEMOGLOBIN g/dL 9.0* 8.5* 7.0*   HEMATOCRIT % 29.3* 28.2* 24.1*   PLATELETS AUTO x10*3/uL 170 183 192   NEUTROS PCT AUTO %  --   --  87.7   LYMPHS PCT AUTO %  --   --  6.4   MONOS PCT AUTO %  --   --  4.2   EOS PCT AUTO %  --   --  0.3     Results from last 7 days   Lab Units 07/22/24  0616 07/21/24  1457 07/21/24  0602   SODIUM mmol/L 149* 150* 151*   POTASSIUM mmol/L 3.3* 3.7 4.2   CHLORIDE mmol/L 116* 115* 117*   CO2 mmol/L 20* 24 23   BUN mg/dL 52* 58* 58*   CREATININE mg/dL 1.41* 1.54* 1.54*   GLUCOSE mg/dL 90 116* 90   CALCIUM mg/dL 7.9* 8.0* 8.0*       CT chest abdomen pelvis wo IV contrast   Final Result   Addendum (preliminary) 1 of 1   Interpreted By:  Sonal Benson,    ADDENDUM:   There appears to be a mass in the posterior left chest wall. This may   represent a lipoma.        Signed by: Sonal Benson 7/22/2024 9:44 AM        -------- ORIGINAL REPORT --------   Dictation workstation:   [id number]      Final   CHEST:   There is bilateral parenchymal infiltration. There is pleural fluid.   There is elevation of the right hemidiaphragm.        The anatomy is distorted due to the patient's position.        ABDOMEN-PELVIS:   Extremely limited exam due to the lack of contrast or body fat.        Apparent multiple large masses within the liver. These were noted on   the recent ultrasound of the kidneys.        Air bubbles in the subcutaneous tissues of the anterior abdominal   wall.        There are numerous small air collections within the abdomen that are   presumably within diverticula or bowel.        Signed by: Sonal Benson 7/22/2024 9:38 AM   Dictation workstation:   [id number]      US renal complete   Final Result   1. No hydronephrosis. Incidental note is made of a left renal cyst   2. Layering echogenic material in the urinary bladder could represent   debris, clot, or nonshadowing stones.   3. At least 2 large masses are seen in the liver  measuring 10.6 cm in   the right lobe of the liver and 6.4 cm in the left lobe of the liver.   A CT of the abdomen and pelvis using intravenous contrast would be   helpful for further evaluation of the hepatic masses.        .        MACRO:   None.        Signed by: Christiane Perez 7/21/2024 12:28 PM   Dictation workstation:   [id number]      XR chest 1 view   Final Result   Left lower lobe airspace disease is seen, concerning for pneumonia.   Signed by Jessie Mulligan MD               Assessment/Plan   This patient currently has cardiac telemetry ordered; if you would like to modify or discontinue the telemetry order, click here to go to the orders activity to modify/discontinue the order.    Ms. Nice is a 89-year-old female with a past medical history of GI bleed, GERD, cognitive impairment, hypertension, hyperlipidemia, coronary artery disease, mitral regurgitation, atrial tachycardia, atrial fibrillation, sinus node dysfunction, symptomatic bradycardia status post permanent pacemaker placement who was sent from her nursing home with a change in mental status and lab work noting hypernatremia.  Upon presentation labs were notable for sodium of 153, BUN of 67, creatinine of 1.77, albumin of 2.8, total protein 5.6, lactate 4.6 BNP of 140.  Her white count was 12.9, hemoglobin of 7 with normal platelets.  CT imaging was notable for left kidney cysts and 2 sizable liver masses of the right and left lobe as well as bilateral airspace disease.  Nephrology was consulted for acute kidney injury and hypernatremia management.    Ms. Nice is noted to have normal renal function 1 year ago.  Her borderline soft blood pressures, elevated lactate, low hemoglobin in the setting of presumed pneumonia with suggest hemodynamic insult.  Her hypernatremia is due to lack of free water ingestion.  She was placed on 5 % dextrose in 0.45% saline.  Her sodium is slowly downtrending.  I will adjust her IV fluids to 5% dextrose and 0.2%  saline to run at 75 mL/an hour.  She will receive 40 mEq of potassium chloride.  Despite improvement in her blood pressure, she continues to have lactate elevation.  Her creatinine is improving.  LFTs are not consistent with hepatic impairment.  Trend her lactate and RFP.  Will follow.      Principal Problem:    Pneumonia due to infectious organism, unspecified laterality, unspecified part of lung      I spent 40 minutes in the professional and overall care of this patient.      Igor Mac, DO

## 2024-07-22 NOTE — CARE PLAN
Problem: Fall/Injury  Goal: Not fall by end of shift  Outcome: Progressing  Goal: Be free from injury by end of the shift  Outcome: Progressing  Goal: Verbalize understanding of personal risk factors for fall in the hospital  Outcome: Progressing  Goal: Verbalize understanding of risk factor reduction measures to prevent injury from fall in the home  Outcome: Progressing  Goal: Use assistive devices by end of the shift  Outcome: Progressing  Goal: Pace activities to prevent fatigue by end of the shift  Outcome: Progressing     Problem: Pain  Goal: Takes deep breaths with improved pain control throughout the shift  Outcome: Progressing  Goal: Turns in bed with improved pain control throughout the shift  Outcome: Progressing  Goal: Walks with improved pain control throughout the shift  Outcome: Progressing  Goal: Performs ADL's with improved pain control throughout shift  Outcome: Progressing  Goal: Participates in PT with improved pain control throughout the shift  Outcome: Progressing  Goal: Free from opioid side effects throughout the shift  Outcome: Progressing  Goal: Free from acute confusion related to pain meds throughout the shift  Outcome: Progressing   The patient's goals for the shift include      The clinical goals for the shift include HDS    Over the shift, the patient did not make progress toward the following goals. Barriers to progression include . Recommendations to address these barriers include .

## 2024-07-22 NOTE — PROGRESS NOTES
Chichi Nice is a 89 y.o. female on day 2 of admission presenting with Pneumonia due to infectious organism, unspecified laterality, unspecified part of lung.    Plan: patient admitted from [facility] Care unit, for AMS, PNA, MARTINEZ, receiving IV abx. Palliative Care NP followed up with family this morning who were interested in Hospice. Referral placed for Hospice [facility]. Awaiting meeting at this time.  Disposition: Hospice @ facility vs Home  Barrier: hospice meeting  ADOD:  1-2 days     07/22/24 1245   Discharge Planning   Living Arrangements Alone   Support Systems Family members;Spouse/significant other   Assistance Needed dependent with ADLs   Type of Residence Private residence   Do you have animals or pets at home? No   Who is requesting discharge planning? Provider   Home or Post Acute Services None   Expected Discharge Disposition Hospice Res   Does the patient need discharge transport arranged? Yes   RoundTrip coordination needed? Yes   Has discharge transport been arranged? No       Jeny Frederick RN

## 2024-07-22 NOTE — CONSULTS
INFECTIOUS DISEASE INPATIENT INITIAL CONSULTATION    Referred By: Kylie Bethea    Reason For Consult:  pneumonia, sepsis    HPI:  This is a 89 y.o. female with PMH of A. Fib, s/p PPM, chronic diastolic HF, CAD, CKD, DLD who presented with confusion from NH.    She was able to wake for me but not really giving any specific history. Could answer basic questions albeit I am not certain how accurate responses are. Denies chest pain, shortness of breath, cough, abd pain, urinary issues. Family is present but they don't really know what happened - just that she was confused and seems more lucid this morning.    Afebrile. WBC 12, 14, 12. Blood cx x2 NGTD. Urine cx pending. UA without pyuria. MRSA nares negative. Has hypernatremia and acute renal failure. CT chest with bilateral PNA and also multiple large liver masses.    Allergies:  Patient has no known allergies.     Vitals (Last 24 Hours):  Heart Rate:  [73-74]   Temp:  [35.6 °C (96.1 °F)-36.6 °C (97.8 °F)]   Resp:  [15-19]   BP: ()/(37-56)   SpO2:  [90 %-100 %]      PHYSICAL EXAM:  Gen - NAD, not on O2 support  Heart - RRR, no murmurs  Lungs - clear bilaterally, no wheezing  Abd - soft, no ttp, BS present  Ext - bilateral LE edema present  Skin - no rash    MEDS:    Current Facility-Administered Medications:     acetaminophen (Tylenol) tablet 650 mg, 650 mg, oral, q4h PRN, 650 mg at 07/21/24 2335 **OR** acetaminophen (Tylenol) oral liquid 650 mg, 650 mg, nasogastric tube, q4h PRN **OR** acetaminophen (Tylenol) suppository 650 mg, 650 mg, rectal, q4h PRN, Kylie Bethea MD    acetaminophen (Tylenol) tablet 650 mg, 650 mg, oral, q4h PRN, 650 mg at 07/21/24 1626 **OR** acetaminophen (Tylenol) oral liquid 650 mg, 650 mg, oral, q4h PRN **OR** acetaminophen (Tylenol) suppository 650 mg, 650 mg, rectal, q4h PRN, Kylie Bethea MD    albuterol 2.5 mg /3 mL (0.083 %) nebulizer solution 2.5 mg, 2.5 mg, nebulization, q2h PRN, Kylie  MD Neema    albuterol 2.5 mg /3 mL (0.083 %) nebulizer solution 3 mL, 3 mL, nebulization, TID, Kylie Bethea MD, 3 mL at 07/22/24 0743    amiodarone (Pacerone) tablet 200 mg, 200 mg, oral, Daily, Kylie Bethea MD, 200 mg at 07/22/24 0834    azithromycin (Zithromax) in dextrose 5 % in water (D5W) 250 mL  mg, 500 mg, intravenous, q24h, Kylie Bethea MD, Stopped at 07/21/24 1500    cefTRIAXone (Rocephin) in dextrose 5% IV 2 g, 2 g, intravenous, q24h, Kylie Bethea MD, Stopped at 07/21/24 2014    dextrose 5%-0.45 % sodium chloride infusion, 75 mL/hr, intravenous, Continuous, Kylie Bethea MD, Last Rate: 75 mL/hr at 07/21/24 2335, 75 mL/hr at 07/21/24 2335    dextrose 50 % injection 12.5 g, 12.5 g, intravenous, q15 min PRN, Abelino Waters MD, 12.5 g at 07/20/24 1538    dextrose 50 % injection 25 g, 25 g, intravenous, q15 min PRN, Abelino Waters MD    glucagon (Glucagen) injection 1 mg, 1 mg, intramuscular, q15 min PRN, Abelino Waters MD    glucagon (Glucagen) injection 1 mg, 1 mg, intramuscular, q15 min PRN, Abelino Waters MD    heparin (porcine) injection 5,000 Units, 5,000 Units, subcutaneous, q8h MATY, Kylie Bethea MD, 5,000 Units at 07/22/24 0651    metoprolol succinate XL (Toprol-XL) 24 hr tablet 25 mg, 25 mg, oral, Daily, Kylie Bethea MD, 25 mg at 07/22/24 0834    ondansetron (Zofran) tablet 4 mg, 4 mg, oral, q8h PRN **OR** ondansetron (Zofran) injection 4 mg, 4 mg, intravenous, q8h PRN, Kylie Bethea MD    polyethylene glycol (Glycolax, Miralax) packet 17 g, 17 g, oral, Daily, Kylie Bethea MD, 17 g at 07/22/24 0834    potassium chloride CR (Klor-Con M20) ER tablet 40 mEq, 40 mEq, oral, Once, Kylie Bethea MD    sertraline (Zoloft) tablet 50 mg, 50 mg, oral, Daily, Kylie Bethea MD, 50 mg at 07/22/24 0834     LABS:  Lab Results   Component Value Date    WBC 12.6 (H)  07/22/2024    HGB 9.0 (L) 07/22/2024    HCT 29.3 (L) 07/22/2024    MCV 84 07/22/2024     07/22/2024      Results from last 72 hours   Lab Units 07/22/24  0616   SODIUM mmol/L 149*   POTASSIUM mmol/L 3.3*   CHLORIDE mmol/L 116*   CO2 mmol/L 20*   BUN mg/dL 52*   CREATININE mg/dL 1.41*   GLUCOSE mg/dL 90   CALCIUM mg/dL 7.9*   ANION GAP mmol/L 16   EGFR mL/min/1.73m*2 35*     Results from last 72 hours   Lab Units 07/20/24  1110   ALK PHOS U/L 128   BILIRUBIN TOTAL mg/dL 0.4   PROTEIN TOTAL g/dL 5.6*   ALT U/L 16   AST U/L 28   ALBUMIN g/dL 2.8*     Estimated Creatinine Clearance: 21.5 mL/min (A) (by C-G formula based on SCr of 1.41 mg/dL (H)).       IMAGING:  CT C/A/P 7/22  Impression:     CHEST:  There is bilateral parenchymal infiltration. There is pleural fluid.  There is elevation of the right hemidiaphragm.    The anatomy is distorted due to the patient's position.      ABDOMEN-PELVIS:  Extremely limited exam due to the lack of contrast or body fat.    Apparent multiple large masses within the liver. These were noted on  the recent ultrasound of the kidneys.    Air bubbles in the subcutaneous tissues of the anterior abdominal  wall.    There are numerous small air collections within the abdomen that are  presumably within diverticula or bowel.       ASSESSMENT/PLAN:    Bilateral Infiltrates/PNA - personally reviewed CT chest imaging and does seem to be more infiltrate/PNA over pulmonary edema although she has some LE edema to suggest volume overload as well.  Liver Masses - malignancy?  Leukocytosis  Elevated Lactate    Will continue on IV CTX/Azithro for PNA treatment. Might need more imaging of the liver with MRI. There seems to be some fluid component in the masses on my review of imaging - abscess? Necrotic tumors? Perhaps elevated lactate is related to that.    Monitoring for adverse effects of abx such as rash/itching/diarrhea.    Will follow. Thanks!    Lukas Martinez MD  [facility]  [place]  Office #[phone]

## 2024-07-22 NOTE — CONSULTS
Inpatient consult to Palliative Care  Consult performed by: Marija Stock, APRN-CNP  Consult ordered by: Kylie Bethea MD          Reason For Consult  Reason for Consult: communication / medical decision making     History Of Present Illness  Chichi Nice is a 89 y.o. female with extensive medical hx including:  dementia, CAD, mitral valve regurgitation, HTN, HFpEF, HLD, GERd, CKD III, a-fib not on anticoagulation, dual chamber pacemaker who presented to our ED 7/20/24 from [facility] with reports of poor PO, AMS.  Workup showed pNA,  MARTINEZ and hypernatremia of 153    Renal US  incidentally and unfortunately found two large hepatic masses, one in the right measuring 10.6 cm and one on the left at 6.4 cm..  CT of ABD confirmed hepatic masses, also identified mass near the upper role of right kidney, and a mass though to be a lipoma posterior left chest wall.        Symptoms (0 - 10, Best to Worst)  [place] Symptom Assessment System  0-10 (Numeric) Pain Score: 0 - No pain    BM in last 48 hours? unknown          Personal/Social History  She has no history on file for tobacco use, alcohol use, and drug use.    Functional Status LTC resident             Past Medical History  She has no past medical history on file.    Surgical History  She has no past surgical history on file.     Family History  No family history on file.  Allergies  Patient has no known allergies.    Review of Systems   Unable to perform ROS: Dementia   Constitutional:         Poor historian due to dementia   Gastrointestinal:  Negative for abdominal pain and nausea.        Physical Exam  Constitutional:       Appearance: She is ill-appearing.      Comments: Pleaasant, confused.  Asking where her brother is.   HENT:      Head: Atraumatic.      Comments: Temporal wasting     Nose: Nose normal.      Mouth/Throat:      Mouth: Mucous membranes are moist.      Pharynx: Oropharynx is clear.   Eyes:      Conjunctiva/sclera: Conjunctivae  "normal.      Pupils: Pupils are equal, round, and reactive to light.   Neck:      Comments: Severe kyphosis  Cardiovascular:      Rate and Rhythm: Normal rate and regular rhythm.      Heart sounds: Normal heart sounds.   Pulmonary:      Effort: Pulmonary effort is normal.      Breath sounds: Normal breath sounds.   Abdominal:      General: Abdomen is flat. Bowel sounds are normal.      Palpations: Abdomen is soft.      Tenderness: There is no abdominal tenderness.   Genitourinary:     Comments: deferred  Musculoskeletal:      Comments: +3 pitting edema to BLE up to knees   Skin:     General: Skin is warm and dry.      Coloration: Skin is not jaundiced.   Neurological:      Mental Status: She is alert. She is disoriented.   Psychiatric:         Mood and Affect: Mood normal.      Comments: Does not appear anxious or distressed         Last Recorded Vitals  Blood pressure 126/55, pulse 73, temperature 36.6 °C (97.8 °F), temperature source Temporal, resp. rate 18, height 1.702 m (5' 7\"), weight 53.6 kg (118 lb 3.2 oz), SpO2 97%.    ECG 12 lead    Result Date: 7/22/2024  Atrial-paced rhythm Low voltage QRS Nonspecific T wave abnormality Prolonged QT Abnormal ECG When compared with ECG of 09-APR-2013 09:44, Electronic atrial pacemaker has replaced Sinus rhythm QRS voltage has decreased QT has lengthened    US renal complete    Result Date: 7/21/2024  Interpreted By:  Christiane Perez, STUDY: US RENAL COMPLETE; 7/21/2024 12:20 pm   INDICATION: Signs/Symptoms:MARTINEZ on top of CKD stage III, rule out obstruction.   COMPARISON: None.   ACCESSION NUMBER(S): [id number]   ORDERING CLINICIAN: KARL LOZANO   TECHNIQUE: Grayscale and color Doppler imaging of the kidneys.   FINDINGS: The right kidney measures 11.1 cm.   The left kidney measures 10.7 cm. In the mid left renal cortex there is a 1.5 cm cyst   There is no shadowing calculus, hydronephrosis, or solid mass identified. The renal cortical thickness and echogenicity is " normal.   Limited bladder evaluation: Urinary bladder has echogenic layering material possibly debris, clot, or stones measuring 4.7 x 2.6 x 3.8 cm. The material does not shadow.   Incidental There is ascites. There are liver masses incidentally noted. In the right lobe of the liver there is a 10.6 x 8.6 by 8.6 cm vascular mass. In the left lobe of the liver there is a heterogeneous 6.2 x 4.6 x 6.4 cm solid mass with necrosis.       1. No hydronephrosis. Incidental note is made of a left renal cyst 2. Layering echogenic material in the urinary bladder could represent debris, clot, or nonshadowing stones. 3. At least 2 large masses are seen in the liver measuring 10.6 cm in the right lobe of the liver and 6.4 cm in the left lobe of the liver. A CT of the abdomen and pelvis using intravenous contrast would be helpful for further evaluation of the hepatic masses.   .   MACRO: None.   Signed by: Christiane Perez 7/21/2024 12:28 PM Dictation workstation:   [id number]    XR chest 1 view    Result Date: 7/20/2024  STUDY: Chest Radiograph;  7/20/24 at 10:55 AM INDICATION: Heart failure. COMPARISON: None available. ACCESSION NUMBER(S): [id number] ORDERING CLINICIAN: LAUREL COLEMAN TECHNIQUE:  Frontal chest was obtained at 1055 hours. FINDINGS: CARDIOMEDIASTINAL SILHOUETTE: Cardiomediastinal silhouette is normal in size and configuration. Vascular calcifications are identified.  LUNGS: Chronic interstitial lung changes are identified.  There is elevation of the right hemidiaphragm.  Left-sided cardiac pacer device is seen. Left lower lobe airspace disease is seen.   ABDOMEN: No remarkable upper abdominal findings.  BONES: No acute osseous changes.Generalized osteopenia is noted.    Left lower lobe airspace disease is seen, concerning for pneumonia. Signed by Jessie Mulligan MD      Results for orders placed or performed during the hospital encounter of 07/20/24 (from the past 24 hour(s))   Basic Metabolic Panel   Result Value Ref  Range    Glucose 116 (H) 74 - 99 mg/dL    Sodium 150 (H) 136 - 145 mmol/L    Potassium 3.7 3.5 - 5.3 mmol/L    Chloride 115 (H) 98 - 107 mmol/L    Bicarbonate 24 21 - 32 mmol/L    Anion Gap 15 10 - 20 mmol/L    Urea Nitrogen 58 (H) 6 - 23 mg/dL    Creatinine 1.54 (H) 0.50 - 1.05 mg/dL    eGFR 32 (L) >60 mL/min/1.73m*2    Calcium 8.0 (L) 8.6 - 10.3 mg/dL   Lactate   Result Value Ref Range    Lactate 4.7 (HH) 0.4 - 2.0 mmol/L   Urinalysis with Reflex Culture and Microscopic   Result Value Ref Range    Color, Urine Yellow Light-Yellow, Yellow, Dark-Yellow    Appearance, Urine Clear Clear    Specific Gravity, Urine 1.022 1.005 - 1.035    pH, Urine 6.0 5.0, 5.5, 6.0, 6.5, 7.0, 7.5, 8.0    Protein, Urine 30 (1+) (A) NEGATIVE, 10 (TRACE), 20 (TRACE) mg/dL    Glucose, Urine Normal Normal mg/dL    Blood, Urine NEGATIVE NEGATIVE    Ketones, Urine NEGATIVE NEGATIVE mg/dL    Bilirubin, Urine NEGATIVE NEGATIVE    Urobilinogen, Urine Normal Normal mg/dL    Nitrite, Urine NEGATIVE NEGATIVE    Leukocyte Esterase, Urine 75 Nida/µL (A) NEGATIVE   Urine electrolytes   Result Value Ref Range    Sodium, Urine Random 10 mmol/L    Sodium/Creatinine Ratio 13 Not established. mmol/g Creat    Potassium, Urine Random 41 mmol/L    Potassium/Creatinine Ratio 52 Not established mmol/g Creat    Chloride, Urine Random 16 mmol/L    Chloride/Creatinine Ratio 20 (L) 38 - 318 mmol/g creat    Creatinine, Urine Random 79.6 20.0 - 320.0 mg/dL   Microscopic Only, Urine   Result Value Ref Range    WBC, Urine 1-5 1-5, NONE /HPF    RBC, Urine NONE NONE, 1-2, 3-5 /HPF    Squamous Epithelial Cells, Urine 1-9 (SPARSE) Reference range not established. /HPF    Bacteria, Urine 1+ (A) NONE SEEN /HPF   CBC   Result Value Ref Range    WBC 12.6 (H) 4.4 - 11.3 x10*3/uL    nRBC 0.0 0.0 - 0.0 /100 WBCs    RBC 3.50 (L) 4.00 - 5.20 x10*6/uL    Hemoglobin 9.0 (L) 12.0 - 16.0 g/dL    Hematocrit 29.3 (L) 36.0 - 46.0 %    MCV 84 80 - 100 fL    MCH 25.7 (L) 26.0 - 34.0 pg     MCHC 30.7 (L) 32.0 - 36.0 g/dL    RDW 21.2 (H) 11.5 - 14.5 %    Platelets 170 150 - 450 x10*3/uL   Basic Metabolic Panel   Result Value Ref Range    Glucose 90 74 - 99 mg/dL    Sodium 149 (H) 136 - 145 mmol/L    Potassium 3.3 (L) 3.5 - 5.3 mmol/L    Chloride 116 (H) 98 - 107 mmol/L    Bicarbonate 20 (L) 21 - 32 mmol/L    Anion Gap 16 10 - 20 mmol/L    Urea Nitrogen 52 (H) 6 - 23 mg/dL    Creatinine 1.41 (H) 0.50 - 1.05 mg/dL    eGFR 35 (L) >60 mL/min/1.73m*2    Calcium 7.9 (L) 8.6 - 10.3 mg/dL      Scheduled medications  albuterol, 3 mL, nebulization, TID  amiodarone, 200 mg, oral, Daily  azithromycin, 500 mg, intravenous, q24h  cefTRIAXone, 2 g, intravenous, q24h  heparin (porcine), 5,000 Units, subcutaneous, q8h MATY  metoprolol succinate XL, 25 mg, oral, Daily  polyethylene glycol, 17 g, oral, Daily  sertraline, 50 mg, oral, Daily      Continuous medications  dextrose 5%-0.45 % sodium chloride, 75 mL/hr, Last Rate: 75 mL/hr (07/21/24 2335)      PRN medications  PRN medications: acetaminophen **OR** acetaminophen **OR** acetaminophen, acetaminophen **OR** acetaminophen **OR** acetaminophen, albuterol, dextrose, dextrose, glucagon, glucagon, ondansetron **OR** ondansetron      Assessment/Plan      89 year old with newly identified hepatic and renal masses with ascites. Presumed malignancy.  I shared this info with family at request of Dr. Bethea.  They expressed sadness but also relief at knowing why patient has been declining    Goals of care:  with new info, family would eva hospice consult and are comfortable with DNRCC  -hospice consult order placed  -code status changed to DNRCC  -will place [place] Portable form on chart for travel  -no current sx burden  -comfort feeds ordered as patient failed bedside swallow eval  -non-essentia meds stopped    I spent 30 minutes in the professional and overall care of this patient related to ACP in addition to other time spent in assessment, chart review, and coordination of  care         Marija Stock, APRN-CNP

## 2024-07-23 VITALS
HEART RATE: 78 BPM | RESPIRATION RATE: 20 BRPM | TEMPERATURE: 96.9 F | WEIGHT: 118.2 LBS | HEIGHT: 67 IN | SYSTOLIC BLOOD PRESSURE: 118 MMHG | BODY MASS INDEX: 18.55 KG/M2 | OXYGEN SATURATION: 65 % | DIASTOLIC BLOOD PRESSURE: 75 MMHG

## 2024-07-23 LAB
ANION GAP SERPL CALC-SCNC: 15 MMOL/L (ref 10–20)
BACTERIA UR CULT: NO GROWTH
BUN SERPL-MCNC: 50 MG/DL (ref 6–23)
CALCIUM SERPL-MCNC: 8.2 MG/DL (ref 8.6–10.3)
CHLORIDE SERPL-SCNC: 116 MMOL/L (ref 98–107)
CO2 SERPL-SCNC: 23 MMOL/L (ref 21–32)
CREAT SERPL-MCNC: 1.21 MG/DL (ref 0.5–1.05)
EGFRCR SERPLBLD CKD-EPI 2021: 42 ML/MIN/1.73M*2
ERYTHROCYTE [DISTWIDTH] IN BLOOD BY AUTOMATED COUNT: 21.3 % (ref 11.5–14.5)
GLUCOSE SERPL-MCNC: 65 MG/DL (ref 74–99)
HCT VFR BLD AUTO: 28.4 % (ref 36–46)
HGB BLD-MCNC: 8.2 G/DL (ref 12–16)
MCH RBC QN AUTO: 25.3 PG (ref 26–34)
MCHC RBC AUTO-ENTMCNC: 28.9 G/DL (ref 32–36)
MCV RBC AUTO: 88 FL (ref 80–100)
NRBC BLD-RTO: 0 /100 WBCS (ref 0–0)
PLATELET # BLD AUTO: 178 X10*3/UL (ref 150–450)
POTASSIUM SERPL-SCNC: 3.8 MMOL/L (ref 3.5–5.3)
RBC # BLD AUTO: 3.24 X10*6/UL (ref 4–5.2)
SODIUM SERPL-SCNC: 150 MMOL/L (ref 136–145)
WBC # BLD AUTO: 14 X10*3/UL (ref 4.4–11.3)

## 2024-07-23 PROCEDURE — 85027 COMPLETE CBC AUTOMATED: CPT | Performed by: INTERNAL MEDICINE

## 2024-07-23 PROCEDURE — 99231 SBSQ HOSP IP/OBS SF/LOW 25: CPT | Performed by: NURSE PRACTITIONER

## 2024-07-23 PROCEDURE — 2500000002 HC RX 250 W HCPCS SELF ADMINISTERED DRUGS (ALT 637 FOR MEDICARE OP, ALT 636 FOR OP/ED): Performed by: INTERNAL MEDICINE

## 2024-07-23 PROCEDURE — 2500000004 HC RX 250 GENERAL PHARMACY W/ HCPCS (ALT 636 FOR OP/ED): Performed by: INTERNAL MEDICINE

## 2024-07-23 PROCEDURE — 36415 COLL VENOUS BLD VENIPUNCTURE: CPT | Performed by: INTERNAL MEDICINE

## 2024-07-23 PROCEDURE — 94640 AIRWAY INHALATION TREATMENT: CPT

## 2024-07-23 PROCEDURE — 80048 BASIC METABOLIC PNL TOTAL CA: CPT | Performed by: INTERNAL MEDICINE

## 2024-07-23 RX ORDER — HYDROMORPHONE HYDROCHLORIDE 2 MG/1
1 TABLET ORAL EVERY 4 HOURS PRN
Qty: 10 TABLET | Refills: 0 | Status: SHIPPED | OUTPATIENT
Start: 2024-07-23

## 2024-07-23 RX ORDER — ALBUTEROL SULFATE 0.83 MG/ML
2.5 SOLUTION RESPIRATORY (INHALATION) EVERY 2 HOUR PRN
Start: 2024-07-23

## 2024-07-23 ASSESSMENT — COGNITIVE AND FUNCTIONAL STATUS - GENERAL
DAILY ACTIVITIY SCORE: 6
DRESSING REGULAR LOWER BODY CLOTHING: TOTAL
STANDING UP FROM CHAIR USING ARMS: TOTAL
TURNING FROM BACK TO SIDE WHILE IN FLAT BAD: TOTAL
DRESSING REGULAR UPPER BODY CLOTHING: TOTAL
MOVING FROM LYING ON BACK TO SITTING ON SIDE OF FLAT BED WITH BEDRAILS: TOTAL
PERSONAL GROOMING: TOTAL
MOBILITY SCORE: 6
MOVING TO AND FROM BED TO CHAIR: TOTAL
TOILETING: TOTAL
HELP NEEDED FOR BATHING: TOTAL
EATING MEALS: TOTAL
CLIMB 3 TO 5 STEPS WITH RAILING: TOTAL
WALKING IN HOSPITAL ROOM: TOTAL

## 2024-07-23 ASSESSMENT — PAIN SCALES - GENERAL: PAINLEVEL_OUTOF10: 0 - NO PAIN

## 2024-07-23 NOTE — CARE PLAN
The patient's goals for the shift include      The clinical goals for the shift include pt will remain safe    Problem: Fall/Injury  Goal: Not fall by end of shift  Outcome: Progressing  Goal: Be free from injury by end of the shift  Outcome: Progressing     Problem: Skin  Goal: Prevent/minimize sheer/friction injuries  Outcome: Progressing  Goal: Promote skin healing  Outcome: Progressing

## 2024-07-23 NOTE — DISCHARGE SUMMARY
Discharge Diagnosis  Pneumonia due to infectious organism, unspecified laterality, unspecified part of lung  Liver masses most likely meta stasis  Worsening dementia  Dysphagia  Dehydration  Failure to thrive  Hyponatremia  Acute kidney injury      Issues Requiring Follow-Up  She was discharged with DNR CC and hospice care    Discharge Meds     Your medication list        ASK your doctor about these medications        Instructions Last Dose Given Next Dose Due   acetaminophen 325 mg tablet  Commonly known as: Tylenol           amiodarone 200 mg tablet  Commonly known as: Pacerone           amLODIPine 10 mg tablet  Commonly known as: Norvasc           divalproex sprinkle 125 mg DR capsule  Commonly known as: Depakote Sprinkle           fluticasone 50 mcg/actuation nasal spray  Commonly known as: Flonase           furosemide 20 mg tablet  Commonly known as: Lasix           metoprolol succinate XL 25 mg 24 hr tablet  Commonly known as: Toprol-XL           omeprazole 20 mg DR capsule  Commonly known as: PriLOSEC           potassium chloride 20 mEq packet  Commonly known as: Klor-Con           sertraline 50 mg tablet  Commonly known as: Zoloft           sodium chloride (PF) 0.9% injection           traZODone 50 mg tablet  Commonly known as: Desyrel                    Test Results Pending At Discharge  Pending Labs       Order Current Status    Blood Culture Preliminary result    Blood Culture Preliminary result            Hospital Course   89-year-old -American female, who is known to history of coronary artery disease, moderate MR, hypertension, chronic diastolic heart failure, hyperlipidemia, GERD, CKD 3, atrial fibrillation, dual-chamber pacemaker, sent to the emergency department with abnormal labs and altered mental status.  Pneumonia, left lower lobe pneumonia, on antibiotics,.  Hyponatremia MARTINEZ, on IV fluids, again the patient has history of chronic diastolic heart failure we will monitor.  We will also get  a nephrology consult.  History of coronary artery disease, heart failure A-fib a flutter dual-chamber pacemaker, we will get a cardiology evaluation.  History of hypertension continue to monitor.  Speech therapy.  Her appetite is very poor, p.o. intake is very poor,.  Also noted a ultrasound scan of the kidney report, it also shows hepatic masses, I ordered a CAT scan of the chest abdomen pelvis,.  Which showed possible liver mets, overall her appetite is poor, and also she had to be nothing by mouth because she has been aspirating, she will be discharged to nursing home with hospice of the [facility]    Pertinent Physical Exam At Time of Discharge  Physical Exam  Comfortable  Heart S1-S2.  Lungs reduced entry.  Abdomen is soft nontender.  Legs no edema generalized weakness  Outpatient Follow-Up  No future appointments.  Discharge timing more than 35 minutes    Kylie Bethea MD

## 2024-07-23 NOTE — PROGRESS NOTES
"  INFECTIOUS DISEASE DAILY PROGRESS NOTE    SUBJECTIVE:    Discussed with Dr. Bethea and plans are for hospice transition.    OBJECTIVE:  VITALS (Last 24 Hours)  BP (!) 119/49 (BP Location: Left arm, Patient Position: Lying)   Pulse 78   Temp 36.9 °C (98.4 °F) (Temporal)   Resp 20   Ht 1.702 m (5' 7\")   Wt 53.6 kg (118 lb 3.2 oz)   SpO2 94%   BMI 18.51 kg/m²     PHYSICAL EXAM:  Gen - NAD, in bed  Heart - RRR  Abd - no ttp  Skin - no rash    ABX: IV CTX/Azithro    LABS:  Lab Results   Component Value Date    WBC 14.0 (H) 07/23/2024    HGB 8.2 (L) 07/23/2024    HCT 28.4 (L) 07/23/2024    MCV 88 07/23/2024     07/23/2024     Lab Results   Component Value Date    GLUCOSE 65 (L) 07/23/2024    CALCIUM 8.2 (L) 07/23/2024     (H) 07/23/2024    K 3.8 07/23/2024    CO2 23 07/23/2024     (H) 07/23/2024    BUN 50 (H) 07/23/2024    CREATININE 1.21 (H) 07/23/2024     Results from last 72 hours   Lab Units 07/20/24  1110   ALK PHOS U/L 128   BILIRUBIN TOTAL mg/dL 0.4   PROTEIN TOTAL g/dL 5.6*   ALT U/L 16   AST U/L 28   ALBUMIN g/dL 2.8*     Estimated Creatinine Clearance: 25.1 mL/min (A) (by C-G formula based on SCr of 1.21 mg/dL (H)).    ASSESSMENT/PLAN:     With her age and highly suspected malignancy in the liver I agree hospice care is appropriate as treatment options are not likely to help and may promote suffering.     She can stop abx.    Will sign off. Please call back with questions. Thanks! D/w Dr. Neema Martinez MD  [facility]  Office #[phone]      "

## 2024-07-23 NOTE — PROGRESS NOTES
PALLIATIVE CARE SOCIAL WORK NOTE     [facility] RN met virtually with [facility] shaina Kirkland late yesterday, pt's daughter was at bedside. Hospice consents have been signed. The plan will be for her to return to [facility] today with [facility] following. [facility] was unable to speak to anyone at [facility] yesterday to coordinate, but will try again this morning. Will continue to follow as appropriate. Thank you.     ALEX Parmar     Addendum 12:00pm Pt to return to [facility] with [facility] this afternoon. Arrangements per [facility]. Thank you.

## 2024-07-23 NOTE — PROGRESS NOTES
"Chichi Nice is a 89 y.o. female on day 3 of admission presenting with Pneumonia due to infectious organism, unspecified laterality, unspecified part of lung.  Workup here revealed two very large hepatic mass as well as a mass on her kidney.  Family has elected for hospice care at this time and plan is to transfer her back to her LTC facility with [facility] today.    Patient unable to provide ROS due to dementia but appears in NAD    Subjective   Symptoms (0 - 10, Best to Worst)  Bee Symptom Assessment System  0-10 (Numeric) Pain Score: 0 - No pain          Objective     Physical Exam  Constitutional:       Appearance: She is ill-appearing.   HENT:      Head: Atraumatic.      Nose: Nose normal.      Mouth/Throat:      Mouth: Mucous membranes are moist.      Pharynx: Oropharynx is clear.   Eyes:      Conjunctiva/sclera: Conjunctivae normal.      Pupils: Pupils are equal, round, and reactive to light.   Neck:      Comments: kyphosis  Cardiovascular:      Rate and Rhythm: Normal rate and regular rhythm.      Heart sounds: Normal heart sounds.   Pulmonary:      Effort: Pulmonary effort is normal.      Breath sounds: Normal breath sounds.   Abdominal:      General: Abdomen is flat. Bowel sounds are normal.      Palpations: Abdomen is soft.      Tenderness: There is no abdominal tenderness.   Genitourinary:     Comments: deferred  Musculoskeletal:         General: Swelling present.      Right lower leg: Edema present.      Left lower leg: Edema present.   Skin:     General: Skin is warm and dry.   Neurological:      Mental Status: She is alert. Mental status is at baseline.   Psychiatric:         Mood and Affect: Mood normal.         Behavior: Behavior normal.         Last Recorded Vitals  Blood pressure 118/75, pulse 78, temperature 36.1 °C (96.9 °F), resp. rate 20, height 1.702 m (5' 7\"), weight 53.6 kg (118 lb 3.2 oz), SpO2 (!) 65%.  Intake/Output last 3 Shifts:  I/O last 3 completed shifts:  In: 1271 (23.7 mL/kg) " [I.V.:1221 (22.8 mL/kg); IV Piggyback:50]  Out: 100 (1.9 mL/kg) [Urine:100 (0.1 mL/kg/hr)]  Weight: 53.6 kg     Relevant Results  No new imaging  Results for orders placed or performed during the hospital encounter of 07/20/24 (from the past 24 hour(s))   POCT GLUCOSE   Result Value Ref Range    POCT Glucose 81 74 - 99 mg/dL   CBC   Result Value Ref Range    WBC 14.0 (H) 4.4 - 11.3 x10*3/uL    nRBC 0.0 0.0 - 0.0 /100 WBCs    RBC 3.24 (L) 4.00 - 5.20 x10*6/uL    Hemoglobin 8.2 (L) 12.0 - 16.0 g/dL    Hematocrit 28.4 (L) 36.0 - 46.0 %    MCV 88 80 - 100 fL    MCH 25.3 (L) 26.0 - 34.0 pg    MCHC 28.9 (L) 32.0 - 36.0 g/dL    RDW 21.3 (H) 11.5 - 14.5 %    Platelets 178 150 - 450 x10*3/uL   Basic Metabolic Panel   Result Value Ref Range    Glucose 65 (L) 74 - 99 mg/dL    Sodium 150 (H) 136 - 145 mmol/L    Potassium 3.8 3.5 - 5.3 mmol/L    Chloride 116 (H) 98 - 107 mmol/L    Bicarbonate 23 21 - 32 mmol/L    Anion Gap 15 10 - 20 mmol/L    Urea Nitrogen 50 (H) 6 - 23 mg/dL    Creatinine 1.21 (H) 0.50 - 1.05 mg/dL    eGFR 42 (L) >60 mL/min/1.73m*2    Calcium 8.2 (L) 8.6 - 10.3 mg/dL    Scheduled medications  amiodarone, 200 mg, oral, Daily  azithromycin, 500 mg, intravenous, q24h  cefTRIAXone, 2 g, intravenous, q24h  heparin (porcine), 5,000 Units, subcutaneous, q8h MATY      Continuous medications  dextrose 5%-0.2 % sod chloride, 75 mL/hr, Last Rate: 75 mL/hr (07/22/24 1107)      PRN medications  PRN medications: acetaminophen **OR** acetaminophen **OR** acetaminophen, acetaminophen **OR** acetaminophen **OR** acetaminophen, albuterol, dextrose, dextrose, glucagon, glucagon, ondansetron **OR** ondansetron             Assessment/Plan   89 year old with newly identified hepatic and renal masses with ascites. Presumed malignancy.  I shared this info with family at request of Dr. Bethea.  They expressed sadness but also relief at knowing why patient has been declining     Goals of care:  hospice discharge planned for  today  -code status changed to DNRCC  -[place] Portable form on chart for travel  -no current sx burden  -comfort feeds ordered as patient failed bedside swallow eval  -non-essential meds stopped          I spent 30 minutes in the professional and overall care of this patient.      Marija Stock, APRN-CNP

## 2024-07-23 NOTE — PROGRESS NOTES
Pt to discharge to [facility] at noon. Spoke with Melanie at facility who states equipment is in place. Knows to call [facility] when pt arrives for Hospice visit. Family bedside and daughter, Mar aware of discharge time.    Thank you,    Ludy Travis RN  [facility]

## 2024-07-24 LAB
BACTERIA BLD CULT: NORMAL
BACTERIA BLD CULT: NORMAL